# Patient Record
Sex: MALE | Race: WHITE | NOT HISPANIC OR LATINO | Employment: OTHER | URBAN - METROPOLITAN AREA
[De-identification: names, ages, dates, MRNs, and addresses within clinical notes are randomized per-mention and may not be internally consistent; named-entity substitution may affect disease eponyms.]

---

## 2017-07-17 ENCOUNTER — APPOINTMENT (OUTPATIENT)
Dept: GENERAL RADIOLOGY | Facility: HOSPITAL | Age: 77
End: 2017-07-17

## 2017-07-17 ENCOUNTER — HOSPITAL ENCOUNTER (INPATIENT)
Facility: HOSPITAL | Age: 77
LOS: 7 days | Discharge: NURSING, REHAB OR LONG-TERM CARE | End: 2017-07-25
Attending: EMERGENCY MEDICINE | Admitting: INTERNAL MEDICINE

## 2017-07-17 DIAGNOSIS — I95.9 HYPOTENSION, UNSPECIFIED HYPOTENSION TYPE: ICD-10-CM

## 2017-07-17 DIAGNOSIS — R53.1 GENERALIZED WEAKNESS: ICD-10-CM

## 2017-07-17 DIAGNOSIS — J96.01 ACUTE RESPIRATORY FAILURE WITH HYPOXIA (HCC): Primary | ICD-10-CM

## 2017-07-17 DIAGNOSIS — J44.1 COPD EXACERBATION (HCC): ICD-10-CM

## 2017-07-17 LAB
ALBUMIN SERPL-MCNC: 4.1 G/DL (ref 3.5–5.2)
ALBUMIN/GLOB SERPL: 1.1 G/DL
ALP SERPL-CCNC: 108 U/L (ref 39–117)
ALT SERPL W P-5'-P-CCNC: 44 U/L (ref 1–41)
ANION GAP SERPL CALCULATED.3IONS-SCNC: 12.1 MMOL/L
ARTERIAL PATENCY WRIST A: ABNORMAL
AST SERPL-CCNC: 57 U/L (ref 1–40)
ATMOSPHERIC PRESS: 752.2 MMHG
BASE EXCESS BLDA CALC-SCNC: 8.8 MMOL/L (ref 0–2)
BASOPHILS # BLD AUTO: 0.01 10*3/MM3 (ref 0–0.2)
BASOPHILS NFR BLD AUTO: 0.1 % (ref 0–1.5)
BDY SITE: ABNORMAL
BILIRUB SERPL-MCNC: 0.5 MG/DL (ref 0.1–1.2)
BUN BLD-MCNC: 45 MG/DL (ref 8–23)
BUN/CREAT SERPL: 54.9 (ref 7–25)
CALCIUM SPEC-SCNC: 9.9 MG/DL (ref 8.6–10.5)
CHLORIDE SERPL-SCNC: 103 MMOL/L (ref 98–107)
CO2 SERPL-SCNC: 33.9 MMOL/L (ref 22–29)
CREAT BLD-MCNC: 0.82 MG/DL (ref 0.76–1.27)
D-LACTATE SERPL-SCNC: 1.2 MMOL/L (ref 0.5–2)
DEPRECATED RDW RBC AUTO: 51.9 FL (ref 37–54)
EOSINOPHIL # BLD AUTO: 0.01 10*3/MM3 (ref 0–0.7)
EOSINOPHIL NFR BLD AUTO: 0.1 % (ref 0.3–6.2)
ERYTHROCYTE [DISTWIDTH] IN BLOOD BY AUTOMATED COUNT: 13.3 % (ref 11.5–14.5)
GFR SERPL CREATININE-BSD FRML MDRD: 91 ML/MIN/1.73
GLOBULIN UR ELPH-MCNC: 3.9 GM/DL
GLUCOSE BLD-MCNC: 125 MG/DL (ref 65–99)
HCO3 BLDA-SCNC: 36.8 MMOL/L (ref 22–28)
HCT VFR BLD AUTO: 43.1 % (ref 40.4–52.2)
HGB BLD-MCNC: 14 G/DL (ref 13.7–17.6)
IMM GRANULOCYTES # BLD: 0.03 10*3/MM3 (ref 0–0.03)
IMM GRANULOCYTES NFR BLD: 0.2 % (ref 0–0.5)
LYMPHOCYTES # BLD AUTO: 0.65 10*3/MM3 (ref 0.9–4.8)
LYMPHOCYTES NFR BLD AUTO: 4.7 % (ref 19.6–45.3)
MACROCYTES BLD QL SMEAR: NORMAL
MCH RBC QN AUTO: 34.6 PG (ref 27–32.7)
MCHC RBC AUTO-ENTMCNC: 32.5 G/DL (ref 32.6–36.4)
MCV RBC AUTO: 106.4 FL (ref 79.8–96.2)
MODALITY: ABNORMAL
MONOCYTES # BLD AUTO: 0.61 10*3/MM3 (ref 0.2–1.2)
MONOCYTES NFR BLD AUTO: 4.4 % (ref 5–12)
NEUTROPHILS # BLD AUTO: 12.61 10*3/MM3 (ref 1.9–8.1)
NEUTROPHILS NFR BLD AUTO: 90.5 % (ref 42.7–76)
NT-PROBNP SERPL-MCNC: 490.1 PG/ML (ref 0–1800)
PCO2 BLDA: 63.8 MM HG (ref 35–45)
PH BLDA: 7.37 PH UNITS (ref 7.35–7.45)
PLAT MORPH BLD: NORMAL
PLATELET # BLD AUTO: 161 10*3/MM3 (ref 140–500)
PMV BLD AUTO: 10.6 FL (ref 6–12)
PO2 BLDA: 79.4 MM HG (ref 80–100)
POTASSIUM BLD-SCNC: 4.9 MMOL/L (ref 3.5–5.2)
PROT SERPL-MCNC: 8 G/DL (ref 6–8.5)
RBC # BLD AUTO: 4.05 10*6/MM3 (ref 4.6–6)
SAO2 % BLDCOA: 94.7 % (ref 92–99)
SET MECH RESP RATE: 28
SODIUM BLD-SCNC: 149 MMOL/L (ref 136–145)
STOMATOCYTES BLD QL SMEAR: NORMAL
TROPONIN T SERPL-MCNC: 0.01 NG/ML (ref 0–0.03)
WBC MORPH BLD: NORMAL
WBC NRBC COR # BLD: 13.92 10*3/MM3 (ref 4.5–10.7)

## 2017-07-17 PROCEDURE — 36600 WITHDRAWAL OF ARTERIAL BLOOD: CPT

## 2017-07-17 PROCEDURE — 93010 ELECTROCARDIOGRAM REPORT: CPT | Performed by: INTERNAL MEDICINE

## 2017-07-17 PROCEDURE — 84484 ASSAY OF TROPONIN QUANT: CPT | Performed by: EMERGENCY MEDICINE

## 2017-07-17 PROCEDURE — 87040 BLOOD CULTURE FOR BACTERIA: CPT | Performed by: EMERGENCY MEDICINE

## 2017-07-17 PROCEDURE — 99285 EMERGENCY DEPT VISIT HI MDM: CPT

## 2017-07-17 PROCEDURE — 85025 COMPLETE CBC W/AUTO DIFF WBC: CPT | Performed by: EMERGENCY MEDICINE

## 2017-07-17 PROCEDURE — 93005 ELECTROCARDIOGRAM TRACING: CPT

## 2017-07-17 PROCEDURE — 80053 COMPREHEN METABOLIC PANEL: CPT | Performed by: EMERGENCY MEDICINE

## 2017-07-17 PROCEDURE — 71020 HC CHEST PA AND LATERAL: CPT

## 2017-07-17 PROCEDURE — 94640 AIRWAY INHALATION TREATMENT: CPT

## 2017-07-17 PROCEDURE — 83605 ASSAY OF LACTIC ACID: CPT | Performed by: EMERGENCY MEDICINE

## 2017-07-17 PROCEDURE — 85007 BL SMEAR W/DIFF WBC COUNT: CPT | Performed by: EMERGENCY MEDICINE

## 2017-07-17 PROCEDURE — 83880 ASSAY OF NATRIURETIC PEPTIDE: CPT | Performed by: EMERGENCY MEDICINE

## 2017-07-17 PROCEDURE — 82803 BLOOD GASES ANY COMBINATION: CPT

## 2017-07-17 RX ORDER — IPRATROPIUM BROMIDE AND ALBUTEROL SULFATE 2.5; .5 MG/3ML; MG/3ML
3 SOLUTION RESPIRATORY (INHALATION) EVERY 4 HOURS PRN
COMMUNITY

## 2017-07-17 RX ORDER — METHYLPREDNISOLONE SODIUM SUCCINATE 125 MG/2ML
125 INJECTION, POWDER, LYOPHILIZED, FOR SOLUTION INTRAMUSCULAR; INTRAVENOUS ONCE
Status: COMPLETED | OUTPATIENT
Start: 2017-07-17 | End: 2017-07-18

## 2017-07-17 RX ORDER — IPRATROPIUM BROMIDE AND ALBUTEROL SULFATE 2.5; .5 MG/3ML; MG/3ML
3 SOLUTION RESPIRATORY (INHALATION) ONCE
Status: COMPLETED | OUTPATIENT
Start: 2017-07-17 | End: 2017-07-17

## 2017-07-17 RX ORDER — SODIUM CHLORIDE 0.9 % (FLUSH) 0.9 %
10 SYRINGE (ML) INJECTION AS NEEDED
Status: DISCONTINUED | OUTPATIENT
Start: 2017-07-17 | End: 2017-07-25 | Stop reason: HOSPADM

## 2017-07-17 RX ADMIN — SODIUM CHLORIDE 1905 ML: 9 INJECTION, SOLUTION INTRAVENOUS at 23:27

## 2017-07-17 RX ADMIN — IPRATROPIUM BROMIDE AND ALBUTEROL SULFATE 3 ML: .5; 3 SOLUTION RESPIRATORY (INHALATION) at 22:45

## 2017-07-18 LAB
ANION GAP SERPL CALCULATED.3IONS-SCNC: 10.3 MMOL/L
BUN BLD-MCNC: 35 MG/DL (ref 8–23)
BUN/CREAT SERPL: 46.7 (ref 7–25)
CALCIUM SPEC-SCNC: 8.3 MG/DL (ref 8.6–10.5)
CHLORIDE SERPL-SCNC: 104 MMOL/L (ref 98–107)
CO2 SERPL-SCNC: 28.7 MMOL/L (ref 22–29)
CREAT BLD-MCNC: 0.75 MG/DL (ref 0.76–1.27)
DEPRECATED RDW RBC AUTO: 52.5 FL (ref 37–54)
ERYTHROCYTE [DISTWIDTH] IN BLOOD BY AUTOMATED COUNT: 13.4 % (ref 11.5–14.5)
GFR SERPL CREATININE-BSD FRML MDRD: 101 ML/MIN/1.73
GLUCOSE BLD-MCNC: 129 MG/DL (ref 65–99)
GLUCOSE BLDC GLUCOMTR-MCNC: 120 MG/DL (ref 70–130)
GLUCOSE BLDC GLUCOMTR-MCNC: 126 MG/DL (ref 70–130)
GLUCOSE BLDC GLUCOMTR-MCNC: 126 MG/DL (ref 70–130)
GLUCOSE BLDC GLUCOMTR-MCNC: 138 MG/DL (ref 70–130)
HCT VFR BLD AUTO: 35.8 % (ref 40.4–52.2)
HGB BLD-MCNC: 11.4 G/DL (ref 13.7–17.6)
HOLD SPECIMEN: NORMAL
HOLD SPECIMEN: NORMAL
MAGNESIUM SERPL-MCNC: 2.7 MG/DL (ref 1.6–2.4)
MCH RBC QN AUTO: 34.3 PG (ref 27–32.7)
MCHC RBC AUTO-ENTMCNC: 31.8 G/DL (ref 32.6–36.4)
MCV RBC AUTO: 107.8 FL (ref 79.8–96.2)
PHOSPHATE SERPL-MCNC: 3.6 MG/DL (ref 2.5–4.5)
PLATELET # BLD AUTO: 123 10*3/MM3 (ref 140–500)
PMV BLD AUTO: 10.5 FL (ref 6–12)
POTASSIUM BLD-SCNC: 5.1 MMOL/L (ref 3.5–5.2)
RBC # BLD AUTO: 3.32 10*6/MM3 (ref 4.6–6)
SODIUM BLD-SCNC: 143 MMOL/L (ref 136–145)
WBC NRBC COR # BLD: 7.48 10*3/MM3 (ref 4.5–10.7)
WHOLE BLOOD HOLD SPECIMEN: NORMAL
WHOLE BLOOD HOLD SPECIMEN: NORMAL

## 2017-07-18 PROCEDURE — 84100 ASSAY OF PHOSPHORUS: CPT | Performed by: INTERNAL MEDICINE

## 2017-07-18 PROCEDURE — 94799 UNLISTED PULMONARY SVC/PX: CPT

## 2017-07-18 PROCEDURE — 82962 GLUCOSE BLOOD TEST: CPT

## 2017-07-18 PROCEDURE — 80048 BASIC METABOLIC PNL TOTAL CA: CPT | Performed by: INTERNAL MEDICINE

## 2017-07-18 PROCEDURE — 25010000002 METHYLPREDNISOLONE PER 40 MG: Performed by: INTERNAL MEDICINE

## 2017-07-18 PROCEDURE — 25010000002 METHYLPREDNISOLONE PER 125 MG: Performed by: PHYSICIAN ASSISTANT

## 2017-07-18 PROCEDURE — 85027 COMPLETE CBC AUTOMATED: CPT | Performed by: INTERNAL MEDICINE

## 2017-07-18 PROCEDURE — 83735 ASSAY OF MAGNESIUM: CPT | Performed by: INTERNAL MEDICINE

## 2017-07-18 PROCEDURE — 25010000002 PIPERACILLIN SOD-TAZOBACTAM PER 1 G: Performed by: PHYSICIAN ASSISTANT

## 2017-07-18 PROCEDURE — 25010000002 ENOXAPARIN PER 10 MG: Performed by: INTERNAL MEDICINE

## 2017-07-18 RX ORDER — DEXTROSE MONOHYDRATE 25 G/50ML
25 INJECTION, SOLUTION INTRAVENOUS
Status: DISCONTINUED | OUTPATIENT
Start: 2017-07-18 | End: 2017-07-25 | Stop reason: HOSPADM

## 2017-07-18 RX ORDER — BUDESONIDE 0.5 MG/2ML
0.5 INHALANT ORAL
Status: DISCONTINUED | OUTPATIENT
Start: 2017-07-18 | End: 2017-07-18

## 2017-07-18 RX ORDER — AZITHROMYCIN 250 MG/1
500 TABLET, FILM COATED ORAL
Status: COMPLETED | OUTPATIENT
Start: 2017-07-19 | End: 2017-07-21

## 2017-07-18 RX ORDER — SODIUM CHLORIDE 450 MG/100ML
125 INJECTION, SOLUTION INTRAVENOUS CONTINUOUS
Status: DISCONTINUED | OUTPATIENT
Start: 2017-07-18 | End: 2017-07-20

## 2017-07-18 RX ORDER — BUDESONIDE 0.5 MG/2ML
0.5 INHALANT ORAL
Status: DISCONTINUED | OUTPATIENT
Start: 2017-07-18 | End: 2017-07-25 | Stop reason: HOSPADM

## 2017-07-18 RX ORDER — IPRATROPIUM BROMIDE AND ALBUTEROL SULFATE 2.5; .5 MG/3ML; MG/3ML
3 SOLUTION RESPIRATORY (INHALATION)
Status: DISCONTINUED | OUTPATIENT
Start: 2017-07-18 | End: 2017-07-25 | Stop reason: HOSPADM

## 2017-07-18 RX ORDER — METHYLPREDNISOLONE SODIUM SUCCINATE 40 MG/ML
40 INJECTION, POWDER, LYOPHILIZED, FOR SOLUTION INTRAMUSCULAR; INTRAVENOUS EVERY 8 HOURS
Status: DISCONTINUED | OUTPATIENT
Start: 2017-07-18 | End: 2017-07-20

## 2017-07-18 RX ORDER — NICOTINE POLACRILEX 4 MG
15 LOZENGE BUCCAL
Status: DISCONTINUED | OUTPATIENT
Start: 2017-07-18 | End: 2017-07-25 | Stop reason: HOSPADM

## 2017-07-18 RX ADMIN — METHYLPREDNISOLONE SODIUM SUCCINATE 40 MG: 40 INJECTION, POWDER, LYOPHILIZED, FOR SOLUTION INTRAMUSCULAR; INTRAVENOUS at 08:22

## 2017-07-18 RX ADMIN — TAZOBACTAM SODIUM AND PIPERACILLIN SODIUM 4.5 G: 500; 4 INJECTION, SOLUTION INTRAVENOUS at 00:22

## 2017-07-18 RX ADMIN — ENOXAPARIN SODIUM 30 MG: 30 INJECTION SUBCUTANEOUS at 05:22

## 2017-07-18 RX ADMIN — IPRATROPIUM BROMIDE AND ALBUTEROL SULFATE 3 ML: .5; 3 SOLUTION RESPIRATORY (INHALATION) at 15:45

## 2017-07-18 RX ADMIN — BUDESONIDE 0.5 MG: 0.5 INHALANT RESPIRATORY (INHALATION) at 19:38

## 2017-07-18 RX ADMIN — BUDESONIDE 0.5 MG: 0.5 INHALANT RESPIRATORY (INHALATION) at 11:20

## 2017-07-18 RX ADMIN — IPRATROPIUM BROMIDE AND ALBUTEROL SULFATE 3 ML: .5; 3 SOLUTION RESPIRATORY (INHALATION) at 19:38

## 2017-07-18 RX ADMIN — SODIUM CHLORIDE 125 ML/HR: 4.5 INJECTION, SOLUTION INTRAVENOUS at 17:08

## 2017-07-18 RX ADMIN — METHYLPREDNISOLONE SODIUM SUCCINATE 125 MG: 125 INJECTION, POWDER, FOR SOLUTION INTRAMUSCULAR; INTRAVENOUS at 00:22

## 2017-07-18 RX ADMIN — METHYLPREDNISOLONE SODIUM SUCCINATE 40 MG: 40 INJECTION, POWDER, LYOPHILIZED, FOR SOLUTION INTRAMUSCULAR; INTRAVENOUS at 23:57

## 2017-07-18 RX ADMIN — IPRATROPIUM BROMIDE AND ALBUTEROL SULFATE 3 ML: .5; 3 SOLUTION RESPIRATORY (INHALATION) at 04:13

## 2017-07-18 RX ADMIN — IPRATROPIUM BROMIDE AND ALBUTEROL SULFATE 3 ML: .5; 3 SOLUTION RESPIRATORY (INHALATION) at 07:38

## 2017-07-18 RX ADMIN — IPRATROPIUM BROMIDE AND ALBUTEROL SULFATE 3 ML: .5; 3 SOLUTION RESPIRATORY (INHALATION) at 11:21

## 2017-07-18 RX ADMIN — Medication 10 ML: at 00:22

## 2017-07-18 RX ADMIN — METHYLPREDNISOLONE SODIUM SUCCINATE 40 MG: 40 INJECTION, POWDER, LYOPHILIZED, FOR SOLUTION INTRAMUSCULAR; INTRAVENOUS at 17:08

## 2017-07-18 RX ADMIN — SODIUM CHLORIDE 125 ML/HR: 4.5 INJECTION, SOLUTION INTRAVENOUS at 01:20

## 2017-07-18 RX ADMIN — SODIUM CHLORIDE 125 ML/HR: 4.5 INJECTION, SOLUTION INTRAVENOUS at 09:00

## 2017-07-19 LAB
GLUCOSE BLDC GLUCOMTR-MCNC: 113 MG/DL (ref 70–130)
GLUCOSE BLDC GLUCOMTR-MCNC: 114 MG/DL (ref 70–130)
GLUCOSE BLDC GLUCOMTR-MCNC: 141 MG/DL (ref 70–130)
GLUCOSE BLDC GLUCOMTR-MCNC: 151 MG/DL (ref 70–130)

## 2017-07-19 PROCEDURE — 25010000002 ENOXAPARIN PER 10 MG: Performed by: INTERNAL MEDICINE

## 2017-07-19 PROCEDURE — 94799 UNLISTED PULMONARY SVC/PX: CPT

## 2017-07-19 PROCEDURE — 82962 GLUCOSE BLOOD TEST: CPT

## 2017-07-19 PROCEDURE — 25010000002 METHYLPREDNISOLONE PER 40 MG: Performed by: INTERNAL MEDICINE

## 2017-07-19 RX ADMIN — AZITHROMYCIN 500 MG: 250 TABLET, FILM COATED ORAL at 09:02

## 2017-07-19 RX ADMIN — METHYLPREDNISOLONE SODIUM SUCCINATE 40 MG: 40 INJECTION, POWDER, LYOPHILIZED, FOR SOLUTION INTRAMUSCULAR; INTRAVENOUS at 16:00

## 2017-07-19 RX ADMIN — IPRATROPIUM BROMIDE AND ALBUTEROL SULFATE 3 ML: .5; 3 SOLUTION RESPIRATORY (INHALATION) at 20:03

## 2017-07-19 RX ADMIN — IPRATROPIUM BROMIDE AND ALBUTEROL SULFATE 3 ML: .5; 3 SOLUTION RESPIRATORY (INHALATION) at 15:50

## 2017-07-19 RX ADMIN — METHYLPREDNISOLONE SODIUM SUCCINATE 40 MG: 40 INJECTION, POWDER, LYOPHILIZED, FOR SOLUTION INTRAMUSCULAR; INTRAVENOUS at 09:02

## 2017-07-19 RX ADMIN — BUDESONIDE 0.5 MG: 0.5 INHALANT RESPIRATORY (INHALATION) at 08:34

## 2017-07-19 RX ADMIN — ENOXAPARIN SODIUM 40 MG: 40 INJECTION SUBCUTANEOUS at 06:28

## 2017-07-19 RX ADMIN — IPRATROPIUM BROMIDE AND ALBUTEROL SULFATE 3 ML: .5; 3 SOLUTION RESPIRATORY (INHALATION) at 04:09

## 2017-07-19 RX ADMIN — SODIUM CHLORIDE 125 ML/HR: 4.5 INJECTION, SOLUTION INTRAVENOUS at 01:14

## 2017-07-19 RX ADMIN — BUDESONIDE 0.5 MG: 0.5 INHALANT RESPIRATORY (INHALATION) at 20:03

## 2017-07-19 RX ADMIN — IPRATROPIUM BROMIDE AND ALBUTEROL SULFATE 3 ML: .5; 3 SOLUTION RESPIRATORY (INHALATION) at 12:06

## 2017-07-19 RX ADMIN — IPRATROPIUM BROMIDE AND ALBUTEROL SULFATE 3 ML: .5; 3 SOLUTION RESPIRATORY (INHALATION) at 08:35

## 2017-07-19 RX ADMIN — SODIUM CHLORIDE 125 ML/HR: 4.5 INJECTION, SOLUTION INTRAVENOUS at 09:03

## 2017-07-19 RX ADMIN — IPRATROPIUM BROMIDE AND ALBUTEROL SULFATE 3 ML: .5; 3 SOLUTION RESPIRATORY (INHALATION) at 00:41

## 2017-07-20 LAB
ANION GAP SERPL CALCULATED.3IONS-SCNC: 11.3 MMOL/L
BUN BLD-MCNC: 19 MG/DL (ref 8–23)
BUN/CREAT SERPL: 34.5 (ref 7–25)
CALCIUM SPEC-SCNC: 8.7 MG/DL (ref 8.6–10.5)
CHLORIDE SERPL-SCNC: 99 MMOL/L (ref 98–107)
CO2 SERPL-SCNC: 27.7 MMOL/L (ref 22–29)
CREAT BLD-MCNC: 0.55 MG/DL (ref 0.76–1.27)
GFR SERPL CREATININE-BSD FRML MDRD: 144 ML/MIN/1.73
GLUCOSE BLD-MCNC: 118 MG/DL (ref 65–99)
GLUCOSE BLDC GLUCOMTR-MCNC: 118 MG/DL (ref 70–130)
GLUCOSE BLDC GLUCOMTR-MCNC: 120 MG/DL (ref 70–130)
GLUCOSE BLDC GLUCOMTR-MCNC: 127 MG/DL (ref 70–130)
GLUCOSE BLDC GLUCOMTR-MCNC: 148 MG/DL (ref 70–130)
POTASSIUM BLD-SCNC: 4.5 MMOL/L (ref 3.5–5.2)
SODIUM BLD-SCNC: 138 MMOL/L (ref 136–145)

## 2017-07-20 PROCEDURE — 94799 UNLISTED PULMONARY SVC/PX: CPT

## 2017-07-20 PROCEDURE — 80048 BASIC METABOLIC PNL TOTAL CA: CPT | Performed by: INTERNAL MEDICINE

## 2017-07-20 PROCEDURE — 25010000002 METHYLPREDNISOLONE PER 40 MG: Performed by: INTERNAL MEDICINE

## 2017-07-20 PROCEDURE — 0T9B70Z DRAINAGE OF BLADDER WITH DRAINAGE DEVICE, VIA NATURAL OR ARTIFICIAL OPENING: ICD-10-PCS | Performed by: UROLOGY

## 2017-07-20 PROCEDURE — 82962 GLUCOSE BLOOD TEST: CPT

## 2017-07-20 PROCEDURE — 25010000002 ENOXAPARIN PER 10 MG: Performed by: INTERNAL MEDICINE

## 2017-07-20 PROCEDURE — 97162 PT EVAL MOD COMPLEX 30 MIN: CPT

## 2017-07-20 PROCEDURE — 97110 THERAPEUTIC EXERCISES: CPT

## 2017-07-20 RX ORDER — METHYLPREDNISOLONE SODIUM SUCCINATE 40 MG/ML
40 INJECTION, POWDER, LYOPHILIZED, FOR SOLUTION INTRAMUSCULAR; INTRAVENOUS EVERY 12 HOURS
Status: DISCONTINUED | OUTPATIENT
Start: 2017-07-20 | End: 2017-07-24

## 2017-07-20 RX ADMIN — IPRATROPIUM BROMIDE AND ALBUTEROL SULFATE 3 ML: .5; 3 SOLUTION RESPIRATORY (INHALATION) at 03:56

## 2017-07-20 RX ADMIN — IPRATROPIUM BROMIDE AND ALBUTEROL SULFATE 3 ML: .5; 3 SOLUTION RESPIRATORY (INHALATION) at 15:49

## 2017-07-20 RX ADMIN — METHYLPREDNISOLONE SODIUM SUCCINATE 40 MG: 40 INJECTION, POWDER, LYOPHILIZED, FOR SOLUTION INTRAMUSCULAR; INTRAVENOUS at 08:54

## 2017-07-20 RX ADMIN — BUDESONIDE 0.5 MG: 0.5 INHALANT RESPIRATORY (INHALATION) at 07:22

## 2017-07-20 RX ADMIN — SODIUM CHLORIDE 125 ML/HR: 4.5 INJECTION, SOLUTION INTRAVENOUS at 01:46

## 2017-07-20 RX ADMIN — IPRATROPIUM BROMIDE AND ALBUTEROL SULFATE 3 ML: .5; 3 SOLUTION RESPIRATORY (INHALATION) at 11:24

## 2017-07-20 RX ADMIN — ENOXAPARIN SODIUM 40 MG: 40 INJECTION SUBCUTANEOUS at 06:40

## 2017-07-20 RX ADMIN — IPRATROPIUM BROMIDE AND ALBUTEROL SULFATE 3 ML: .5; 3 SOLUTION RESPIRATORY (INHALATION) at 23:47

## 2017-07-20 RX ADMIN — AZITHROMYCIN 500 MG: 250 TABLET, FILM COATED ORAL at 08:54

## 2017-07-20 RX ADMIN — IPRATROPIUM BROMIDE AND ALBUTEROL SULFATE 3 ML: .5; 3 SOLUTION RESPIRATORY (INHALATION) at 07:22

## 2017-07-20 RX ADMIN — METHYLPREDNISOLONE SODIUM SUCCINATE 40 MG: 40 INJECTION, POWDER, FOR SOLUTION INTRAMUSCULAR; INTRAVENOUS at 22:24

## 2017-07-20 RX ADMIN — BUDESONIDE 0.5 MG: 0.5 INHALANT RESPIRATORY (INHALATION) at 20:06

## 2017-07-20 RX ADMIN — IPRATROPIUM BROMIDE AND ALBUTEROL SULFATE 3 ML: .5; 3 SOLUTION RESPIRATORY (INHALATION) at 00:26

## 2017-07-20 RX ADMIN — IPRATROPIUM BROMIDE AND ALBUTEROL SULFATE 3 ML: .5; 3 SOLUTION RESPIRATORY (INHALATION) at 20:06

## 2017-07-20 RX ADMIN — METHYLPREDNISOLONE SODIUM SUCCINATE 40 MG: 40 INJECTION, POWDER, LYOPHILIZED, FOR SOLUTION INTRAMUSCULAR; INTRAVENOUS at 01:45

## 2017-07-21 LAB
GLUCOSE BLDC GLUCOMTR-MCNC: 110 MG/DL (ref 70–130)
GLUCOSE BLDC GLUCOMTR-MCNC: 126 MG/DL (ref 70–130)
GLUCOSE BLDC GLUCOMTR-MCNC: 129 MG/DL (ref 70–130)
GLUCOSE BLDC GLUCOMTR-MCNC: 132 MG/DL (ref 70–130)
GLUCOSE BLDC GLUCOMTR-MCNC: 132 MG/DL (ref 70–130)
GLUCOSE BLDC GLUCOMTR-MCNC: 149 MG/DL (ref 70–130)

## 2017-07-21 PROCEDURE — 97110 THERAPEUTIC EXERCISES: CPT

## 2017-07-21 PROCEDURE — 25010000002 METHYLPREDNISOLONE PER 40 MG: Performed by: INTERNAL MEDICINE

## 2017-07-21 PROCEDURE — 25010000002 ENOXAPARIN PER 10 MG: Performed by: INTERNAL MEDICINE

## 2017-07-21 PROCEDURE — 82962 GLUCOSE BLOOD TEST: CPT

## 2017-07-21 PROCEDURE — 94799 UNLISTED PULMONARY SVC/PX: CPT

## 2017-07-21 RX ADMIN — AZITHROMYCIN 500 MG: 250 TABLET, FILM COATED ORAL at 08:12

## 2017-07-21 RX ADMIN — IPRATROPIUM BROMIDE AND ALBUTEROL SULFATE 3 ML: .5; 3 SOLUTION RESPIRATORY (INHALATION) at 10:58

## 2017-07-21 RX ADMIN — IPRATROPIUM BROMIDE AND ALBUTEROL SULFATE 3 ML: .5; 3 SOLUTION RESPIRATORY (INHALATION) at 07:15

## 2017-07-21 RX ADMIN — METHYLPREDNISOLONE SODIUM SUCCINATE 40 MG: 40 INJECTION, POWDER, FOR SOLUTION INTRAMUSCULAR; INTRAVENOUS at 21:02

## 2017-07-21 RX ADMIN — IPRATROPIUM BROMIDE AND ALBUTEROL SULFATE 3 ML: .5; 3 SOLUTION RESPIRATORY (INHALATION) at 16:11

## 2017-07-21 RX ADMIN — IPRATROPIUM BROMIDE AND ALBUTEROL SULFATE 3 ML: .5; 3 SOLUTION RESPIRATORY (INHALATION) at 19:23

## 2017-07-21 RX ADMIN — BUDESONIDE 0.5 MG: 0.5 INHALANT RESPIRATORY (INHALATION) at 19:22

## 2017-07-21 RX ADMIN — IPRATROPIUM BROMIDE AND ALBUTEROL SULFATE 3 ML: .5; 3 SOLUTION RESPIRATORY (INHALATION) at 03:14

## 2017-07-21 RX ADMIN — BUDESONIDE 0.5 MG: 0.5 INHALANT RESPIRATORY (INHALATION) at 07:15

## 2017-07-21 RX ADMIN — METHYLPREDNISOLONE SODIUM SUCCINATE 40 MG: 40 INJECTION, POWDER, FOR SOLUTION INTRAMUSCULAR; INTRAVENOUS at 08:12

## 2017-07-21 RX ADMIN — ENOXAPARIN SODIUM 40 MG: 40 INJECTION SUBCUTANEOUS at 05:37

## 2017-07-22 LAB
BACTERIA SPEC AEROBE CULT: NORMAL
GLUCOSE BLDC GLUCOMTR-MCNC: 100 MG/DL (ref 70–130)
GLUCOSE BLDC GLUCOMTR-MCNC: 121 MG/DL (ref 70–130)
GLUCOSE BLDC GLUCOMTR-MCNC: 129 MG/DL (ref 70–130)
GLUCOSE BLDC GLUCOMTR-MCNC: 134 MG/DL (ref 70–130)

## 2017-07-22 PROCEDURE — 82962 GLUCOSE BLOOD TEST: CPT

## 2017-07-22 PROCEDURE — 97110 THERAPEUTIC EXERCISES: CPT

## 2017-07-22 PROCEDURE — 25010000002 METHYLPREDNISOLONE PER 40 MG: Performed by: INTERNAL MEDICINE

## 2017-07-22 PROCEDURE — 94799 UNLISTED PULMONARY SVC/PX: CPT

## 2017-07-22 PROCEDURE — 25010000002 ENOXAPARIN PER 10 MG: Performed by: INTERNAL MEDICINE

## 2017-07-22 RX ORDER — DOCUSATE SODIUM 50 MG/5 ML
150 LIQUID (ML) ORAL 2 TIMES DAILY
Status: DISCONTINUED | OUTPATIENT
Start: 2017-07-22 | End: 2017-07-25 | Stop reason: HOSPADM

## 2017-07-22 RX ADMIN — METHYLPREDNISOLONE SODIUM SUCCINATE 40 MG: 40 INJECTION, POWDER, FOR SOLUTION INTRAMUSCULAR; INTRAVENOUS at 09:36

## 2017-07-22 RX ADMIN — DOCUSATE SODIUM 150 MG: 50 LIQUID ORAL at 17:39

## 2017-07-22 RX ADMIN — IPRATROPIUM BROMIDE AND ALBUTEROL SULFATE 3 ML: .5; 3 SOLUTION RESPIRATORY (INHALATION) at 11:28

## 2017-07-22 RX ADMIN — ENOXAPARIN SODIUM 40 MG: 40 INJECTION SUBCUTANEOUS at 05:07

## 2017-07-22 RX ADMIN — IPRATROPIUM BROMIDE AND ALBUTEROL SULFATE 3 ML: .5; 3 SOLUTION RESPIRATORY (INHALATION) at 20:17

## 2017-07-22 RX ADMIN — METHYLPREDNISOLONE SODIUM SUCCINATE 40 MG: 40 INJECTION, POWDER, FOR SOLUTION INTRAMUSCULAR; INTRAVENOUS at 21:54

## 2017-07-22 RX ADMIN — IPRATROPIUM BROMIDE AND ALBUTEROL SULFATE 3 ML: .5; 3 SOLUTION RESPIRATORY (INHALATION) at 07:07

## 2017-07-22 RX ADMIN — BUDESONIDE 0.5 MG: 0.5 INHALANT RESPIRATORY (INHALATION) at 07:07

## 2017-07-22 RX ADMIN — BUDESONIDE 0.5 MG: 0.5 INHALANT RESPIRATORY (INHALATION) at 20:17

## 2017-07-22 RX ADMIN — IPRATROPIUM BROMIDE AND ALBUTEROL SULFATE 3 ML: .5; 3 SOLUTION RESPIRATORY (INHALATION) at 15:09

## 2017-07-23 LAB
GLUCOSE BLDC GLUCOMTR-MCNC: 116 MG/DL (ref 70–130)
GLUCOSE BLDC GLUCOMTR-MCNC: 119 MG/DL (ref 70–130)
GLUCOSE BLDC GLUCOMTR-MCNC: 121 MG/DL (ref 70–130)
GLUCOSE BLDC GLUCOMTR-MCNC: 143 MG/DL (ref 70–130)
PLATELET # BLD AUTO: 136 10*3/MM3 (ref 140–500)

## 2017-07-23 PROCEDURE — 97110 THERAPEUTIC EXERCISES: CPT

## 2017-07-23 PROCEDURE — 25010000002 METHYLPREDNISOLONE PER 40 MG: Performed by: INTERNAL MEDICINE

## 2017-07-23 PROCEDURE — 94799 UNLISTED PULMONARY SVC/PX: CPT

## 2017-07-23 PROCEDURE — 85049 AUTOMATED PLATELET COUNT: CPT | Performed by: INTERNAL MEDICINE

## 2017-07-23 PROCEDURE — 82962 GLUCOSE BLOOD TEST: CPT

## 2017-07-23 PROCEDURE — 25010000002 ENOXAPARIN PER 10 MG: Performed by: INTERNAL MEDICINE

## 2017-07-23 RX ORDER — TERAZOSIN 1 MG/1
1 CAPSULE ORAL EVERY 12 HOURS SCHEDULED
Status: DISCONTINUED | OUTPATIENT
Start: 2017-07-23 | End: 2017-07-25 | Stop reason: HOSPADM

## 2017-07-23 RX ADMIN — ENOXAPARIN SODIUM 40 MG: 40 INJECTION SUBCUTANEOUS at 05:50

## 2017-07-23 RX ADMIN — BUDESONIDE 0.5 MG: 0.5 INHALANT RESPIRATORY (INHALATION) at 19:55

## 2017-07-23 RX ADMIN — DOCUSATE SODIUM 150 MG: 50 LIQUID ORAL at 09:09

## 2017-07-23 RX ADMIN — TERAZOSIN HYDROCHLORIDE ANHYDROUS 1 MG: 1 CAPSULE ORAL at 20:48

## 2017-07-23 RX ADMIN — DOCUSATE SODIUM 150 MG: 50 LIQUID ORAL at 17:15

## 2017-07-23 RX ADMIN — IPRATROPIUM BROMIDE AND ALBUTEROL SULFATE 3 ML: .5; 3 SOLUTION RESPIRATORY (INHALATION) at 23:59

## 2017-07-23 RX ADMIN — BUDESONIDE 0.5 MG: 0.5 INHALANT RESPIRATORY (INHALATION) at 07:08

## 2017-07-23 RX ADMIN — IPRATROPIUM BROMIDE AND ALBUTEROL SULFATE 3 ML: .5; 3 SOLUTION RESPIRATORY (INHALATION) at 07:08

## 2017-07-23 RX ADMIN — METHYLPREDNISOLONE SODIUM SUCCINATE 40 MG: 40 INJECTION, POWDER, FOR SOLUTION INTRAMUSCULAR; INTRAVENOUS at 08:49

## 2017-07-23 RX ADMIN — METHYLPREDNISOLONE SODIUM SUCCINATE 40 MG: 40 INJECTION, POWDER, FOR SOLUTION INTRAMUSCULAR; INTRAVENOUS at 20:50

## 2017-07-23 RX ADMIN — TERAZOSIN HYDROCHLORIDE ANHYDROUS 1 MG: 1 CAPSULE ORAL at 14:31

## 2017-07-23 RX ADMIN — IPRATROPIUM BROMIDE AND ALBUTEROL SULFATE 3 ML: .5; 3 SOLUTION RESPIRATORY (INHALATION) at 00:12

## 2017-07-23 RX ADMIN — IPRATROPIUM BROMIDE AND ALBUTEROL SULFATE 3 ML: .5; 3 SOLUTION RESPIRATORY (INHALATION) at 10:49

## 2017-07-23 RX ADMIN — IPRATROPIUM BROMIDE AND ALBUTEROL SULFATE 3 ML: .5; 3 SOLUTION RESPIRATORY (INHALATION) at 03:45

## 2017-07-23 RX ADMIN — IPRATROPIUM BROMIDE AND ALBUTEROL SULFATE 3 ML: .5; 3 SOLUTION RESPIRATORY (INHALATION) at 19:55

## 2017-07-23 RX ADMIN — IPRATROPIUM BROMIDE AND ALBUTEROL SULFATE 3 ML: .5; 3 SOLUTION RESPIRATORY (INHALATION) at 15:06

## 2017-07-24 LAB
GLUCOSE BLDC GLUCOMTR-MCNC: 107 MG/DL (ref 70–130)
GLUCOSE BLDC GLUCOMTR-MCNC: 116 MG/DL (ref 70–130)
GLUCOSE BLDC GLUCOMTR-MCNC: 121 MG/DL (ref 70–130)
GLUCOSE BLDC GLUCOMTR-MCNC: 146 MG/DL (ref 70–130)

## 2017-07-24 PROCEDURE — 25010000002 METHYLPREDNISOLONE PER 40 MG: Performed by: INTERNAL MEDICINE

## 2017-07-24 PROCEDURE — 63710000001 PREDNISONE PER 1 MG: Performed by: INTERNAL MEDICINE

## 2017-07-24 PROCEDURE — 82962 GLUCOSE BLOOD TEST: CPT

## 2017-07-24 PROCEDURE — 97110 THERAPEUTIC EXERCISES: CPT

## 2017-07-24 PROCEDURE — 25010000002 ENOXAPARIN PER 10 MG: Performed by: INTERNAL MEDICINE

## 2017-07-24 PROCEDURE — 94799 UNLISTED PULMONARY SVC/PX: CPT

## 2017-07-24 RX ORDER — PREDNISONE 20 MG/1
20 TABLET ORAL 2 TIMES DAILY WITH MEALS
Status: DISCONTINUED | OUTPATIENT
Start: 2017-07-24 | End: 2017-07-25

## 2017-07-24 RX ADMIN — METHYLPREDNISOLONE SODIUM SUCCINATE 40 MG: 40 INJECTION, POWDER, FOR SOLUTION INTRAMUSCULAR; INTRAVENOUS at 09:22

## 2017-07-24 RX ADMIN — DOCUSATE SODIUM 150 MG: 50 LIQUID ORAL at 09:22

## 2017-07-24 RX ADMIN — IPRATROPIUM BROMIDE AND ALBUTEROL SULFATE 3 ML: .5; 3 SOLUTION RESPIRATORY (INHALATION) at 15:08

## 2017-07-24 RX ADMIN — IPRATROPIUM BROMIDE AND ALBUTEROL SULFATE 3 ML: .5; 3 SOLUTION RESPIRATORY (INHALATION) at 23:43

## 2017-07-24 RX ADMIN — IPRATROPIUM BROMIDE AND ALBUTEROL SULFATE 3 ML: .5; 3 SOLUTION RESPIRATORY (INHALATION) at 11:15

## 2017-07-24 RX ADMIN — BUDESONIDE 0.5 MG: 0.5 INHALANT RESPIRATORY (INHALATION) at 08:18

## 2017-07-24 RX ADMIN — IPRATROPIUM BROMIDE AND ALBUTEROL SULFATE 3 ML: .5; 3 SOLUTION RESPIRATORY (INHALATION) at 19:25

## 2017-07-24 RX ADMIN — BUDESONIDE 0.5 MG: 0.5 INHALANT RESPIRATORY (INHALATION) at 19:26

## 2017-07-24 RX ADMIN — DOCUSATE SODIUM 150 MG: 50 LIQUID ORAL at 17:14

## 2017-07-24 RX ADMIN — IPRATROPIUM BROMIDE AND ALBUTEROL SULFATE 3 ML: .5; 3 SOLUTION RESPIRATORY (INHALATION) at 03:38

## 2017-07-24 RX ADMIN — ENOXAPARIN SODIUM 40 MG: 40 INJECTION SUBCUTANEOUS at 06:42

## 2017-07-24 RX ADMIN — PREDNISONE 20 MG: 20 TABLET ORAL at 17:14

## 2017-07-24 RX ADMIN — TERAZOSIN HYDROCHLORIDE ANHYDROUS 1 MG: 1 CAPSULE ORAL at 09:22

## 2017-07-24 RX ADMIN — IPRATROPIUM BROMIDE AND ALBUTEROL SULFATE 3 ML: .5; 3 SOLUTION RESPIRATORY (INHALATION) at 08:18

## 2017-07-25 VITALS
OXYGEN SATURATION: 96 % | DIASTOLIC BLOOD PRESSURE: 63 MMHG | RESPIRATION RATE: 18 BRPM | TEMPERATURE: 97.7 F | WEIGHT: 140 LBS | BODY MASS INDEX: 23.32 KG/M2 | HEART RATE: 97 BPM | HEIGHT: 65 IN | SYSTOLIC BLOOD PRESSURE: 97 MMHG

## 2017-07-25 LAB
ANION GAP SERPL CALCULATED.3IONS-SCNC: 7 MMOL/L
BUN BLD-MCNC: 25 MG/DL (ref 8–23)
BUN/CREAT SERPL: 44.6 (ref 7–25)
CALCIUM SPEC-SCNC: 9 MG/DL (ref 8.6–10.5)
CHLORIDE SERPL-SCNC: 96 MMOL/L (ref 98–107)
CO2 SERPL-SCNC: 34 MMOL/L (ref 22–29)
CREAT BLD-MCNC: 0.56 MG/DL (ref 0.76–1.27)
GFR SERPL CREATININE-BSD FRML MDRD: 141 ML/MIN/1.73
GLUCOSE BLD-MCNC: 127 MG/DL (ref 65–99)
GLUCOSE BLDC GLUCOMTR-MCNC: 112 MG/DL (ref 70–130)
GLUCOSE BLDC GLUCOMTR-MCNC: 122 MG/DL (ref 70–130)
GLUCOSE BLDC GLUCOMTR-MCNC: 128 MG/DL (ref 70–130)
POTASSIUM BLD-SCNC: 4.9 MMOL/L (ref 3.5–5.2)
SODIUM BLD-SCNC: 137 MMOL/L (ref 136–145)

## 2017-07-25 PROCEDURE — 25010000002 ENOXAPARIN PER 10 MG: Performed by: INTERNAL MEDICINE

## 2017-07-25 PROCEDURE — 82962 GLUCOSE BLOOD TEST: CPT

## 2017-07-25 PROCEDURE — 63710000001 PREDNISONE PER 5 MG: Performed by: INTERNAL MEDICINE

## 2017-07-25 PROCEDURE — 94799 UNLISTED PULMONARY SVC/PX: CPT

## 2017-07-25 PROCEDURE — 63710000001 PREDNISONE PER 1 MG: Performed by: INTERNAL MEDICINE

## 2017-07-25 PROCEDURE — 80048 BASIC METABOLIC PNL TOTAL CA: CPT | Performed by: INTERNAL MEDICINE

## 2017-07-25 PROCEDURE — 97110 THERAPEUTIC EXERCISES: CPT

## 2017-07-25 RX ORDER — TERAZOSIN 1 MG/1
1 CAPSULE ORAL EVERY 12 HOURS SCHEDULED
Qty: 60 CAPSULE | Refills: 1 | Status: SHIPPED | OUTPATIENT
Start: 2017-07-25

## 2017-07-25 RX ORDER — DOCUSATE SODIUM 50 MG/5 ML
150 LIQUID (ML) ORAL DAILY
Qty: 450 ML | Refills: 2 | Status: ON HOLD | OUTPATIENT
Start: 2017-07-25 | End: 2017-08-17

## 2017-07-25 RX ORDER — PREDNISONE 10 MG/1
10 TABLET ORAL 2 TIMES DAILY WITH MEALS
Status: DISCONTINUED | OUTPATIENT
Start: 2017-07-25 | End: 2017-07-25 | Stop reason: HOSPADM

## 2017-07-25 RX ADMIN — IPRATROPIUM BROMIDE AND ALBUTEROL SULFATE 3 ML: .5; 3 SOLUTION RESPIRATORY (INHALATION) at 15:47

## 2017-07-25 RX ADMIN — IPRATROPIUM BROMIDE AND ALBUTEROL SULFATE 3 ML: .5; 3 SOLUTION RESPIRATORY (INHALATION) at 07:24

## 2017-07-25 RX ADMIN — ENOXAPARIN SODIUM 40 MG: 40 INJECTION SUBCUTANEOUS at 06:51

## 2017-07-25 RX ADMIN — IPRATROPIUM BROMIDE AND ALBUTEROL SULFATE 3 ML: .5; 3 SOLUTION RESPIRATORY (INHALATION) at 03:39

## 2017-07-25 RX ADMIN — PREDNISONE 20 MG: 20 TABLET ORAL at 08:01

## 2017-07-25 RX ADMIN — BUDESONIDE 0.5 MG: 0.5 INHALANT RESPIRATORY (INHALATION) at 07:24

## 2017-07-25 RX ADMIN — IPRATROPIUM BROMIDE AND ALBUTEROL SULFATE 3 ML: .5; 3 SOLUTION RESPIRATORY (INHALATION) at 12:08

## 2017-07-25 RX ADMIN — PREDNISONE 10 MG: 10 TABLET ORAL at 18:01

## 2017-07-25 RX ADMIN — DOCUSATE SODIUM 150 MG: 50 LIQUID ORAL at 08:02

## 2017-07-25 RX ADMIN — DOCUSATE SODIUM 150 MG: 50 LIQUID ORAL at 18:00

## 2017-07-25 RX ADMIN — TERAZOSIN HYDROCHLORIDE ANHYDROUS 1 MG: 1 CAPSULE ORAL at 08:01

## 2017-08-17 ENCOUNTER — HOSPITAL ENCOUNTER (OUTPATIENT)
Facility: HOSPITAL | Age: 77
Setting detail: OBSERVATION
Discharge: NURSING, REHAB OR LONG-TERM CARE | End: 2017-08-22
Attending: INTERNAL MEDICINE | Admitting: INTERNAL MEDICINE

## 2017-08-17 DIAGNOSIS — R26.2 DIFFICULTY WALKING: ICD-10-CM

## 2017-08-17 DIAGNOSIS — K92.2 LOWER GI BLEED: Primary | ICD-10-CM

## 2017-08-17 PROBLEM — J96.12 CHRONIC RESPIRATORY FAILURE WITH HYPOXIA AND HYPERCAPNIA (HCC): Status: ACTIVE | Noted: 2017-08-17

## 2017-08-17 PROBLEM — H91.93 BILATERAL DEAFNESS: Status: ACTIVE | Noted: 2017-08-17

## 2017-08-17 PROBLEM — J44.9 COPD (CHRONIC OBSTRUCTIVE PULMONARY DISEASE) (HCC): Status: ACTIVE | Noted: 2017-08-17

## 2017-08-17 PROBLEM — J96.11 CHRONIC RESPIRATORY FAILURE WITH HYPOXIA AND HYPERCAPNIA (HCC): Status: ACTIVE | Noted: 2017-08-17

## 2017-08-17 PROBLEM — Z93.1 S/P PERCUTANEOUS ENDOSCOPIC GASTROSTOMY (PEG) TUBE PLACEMENT (HCC): Status: ACTIVE | Noted: 2017-08-17

## 2017-08-17 PROBLEM — R13.10 DYSPHAGIA: Status: ACTIVE | Noted: 2017-08-17

## 2017-08-17 LAB
ALBUMIN SERPL-MCNC: 3.2 G/DL (ref 3.5–5.2)
ALBUMIN SERPL-MCNC: 3.2 G/DL (ref 3.5–5.2)
ALBUMIN/GLOB SERPL: 1 G/DL
ALBUMIN/GLOB SERPL: 1.1 G/DL
ALP SERPL-CCNC: 141 U/L (ref 39–117)
ALP SERPL-CCNC: 143 U/L (ref 39–117)
ALT SERPL W P-5'-P-CCNC: 110 U/L (ref 1–41)
ALT SERPL W P-5'-P-CCNC: 116 U/L (ref 1–41)
ANION GAP SERPL CALCULATED.3IONS-SCNC: 10.4 MMOL/L
ANION GAP SERPL CALCULATED.3IONS-SCNC: 10.6 MMOL/L
ANION GAP SERPL CALCULATED.3IONS-SCNC: 9.2 MMOL/L
AST SERPL-CCNC: 77 U/L (ref 1–40)
AST SERPL-CCNC: 78 U/L (ref 1–40)
BASOPHILS # BLD AUTO: 0.01 10*3/MM3 (ref 0–0.2)
BASOPHILS NFR BLD AUTO: 0.2 % (ref 0–1.5)
BILIRUB SERPL-MCNC: 0.7 MG/DL (ref 0.1–1.2)
BILIRUB SERPL-MCNC: 0.7 MG/DL (ref 0.1–1.2)
BUN BLD-MCNC: 22 MG/DL (ref 8–23)
BUN BLD-MCNC: 23 MG/DL (ref 8–23)
BUN BLD-MCNC: 28 MG/DL (ref 8–23)
BUN/CREAT SERPL: 40.7 (ref 7–25)
BUN/CREAT SERPL: 42.6 (ref 7–25)
BUN/CREAT SERPL: 51.9 (ref 7–25)
CALCIUM SPEC-SCNC: 8.6 MG/DL (ref 8.6–10.5)
CALCIUM SPEC-SCNC: 8.7 MG/DL (ref 8.6–10.5)
CALCIUM SPEC-SCNC: 8.7 MG/DL (ref 8.6–10.5)
CHLORIDE SERPL-SCNC: 101 MMOL/L (ref 98–107)
CHLORIDE SERPL-SCNC: 102 MMOL/L (ref 98–107)
CHLORIDE SERPL-SCNC: 104 MMOL/L (ref 98–107)
CO2 SERPL-SCNC: 28.4 MMOL/L (ref 22–29)
CO2 SERPL-SCNC: 28.6 MMOL/L (ref 22–29)
CO2 SERPL-SCNC: 29.8 MMOL/L (ref 22–29)
CREAT BLD-MCNC: 0.54 MG/DL (ref 0.76–1.27)
DEPRECATED RDW RBC AUTO: 50.8 FL (ref 37–54)
DEPRECATED RDW RBC AUTO: 52 FL (ref 37–54)
EOSINOPHIL # BLD AUTO: 0.1 10*3/MM3 (ref 0–0.7)
EOSINOPHIL NFR BLD AUTO: 2.1 % (ref 0.3–6.2)
ERYTHROCYTE [DISTWIDTH] IN BLOOD BY AUTOMATED COUNT: 13 % (ref 11.5–14.5)
ERYTHROCYTE [DISTWIDTH] IN BLOOD BY AUTOMATED COUNT: 13.2 % (ref 11.5–14.5)
GFR SERPL CREATININE-BSD FRML MDRD: 147 ML/MIN/1.73
GLOBULIN UR ELPH-MCNC: 2.8 GM/DL
GLOBULIN UR ELPH-MCNC: 3.2 GM/DL
GLUCOSE BLD-MCNC: 73 MG/DL (ref 65–99)
GLUCOSE BLD-MCNC: 98 MG/DL (ref 65–99)
GLUCOSE BLD-MCNC: 98 MG/DL (ref 65–99)
HCT VFR BLD AUTO: 35.4 % (ref 40.4–52.2)
HCT VFR BLD AUTO: 36.5 % (ref 40.4–52.2)
HCT VFR BLD AUTO: 36.6 % (ref 40.4–52.2)
HCT VFR BLD AUTO: 38.1 % (ref 40.4–52.2)
HGB BLD-MCNC: 11.2 G/DL (ref 13.7–17.6)
HGB BLD-MCNC: 11.2 G/DL (ref 13.7–17.6)
HGB BLD-MCNC: 11.4 G/DL (ref 13.7–17.6)
HGB BLD-MCNC: 12.1 G/DL (ref 13.7–17.6)
IMM GRANULOCYTES # BLD: 0.03 10*3/MM3 (ref 0–0.03)
IMM GRANULOCYTES NFR BLD: 0.6 % (ref 0–0.5)
INR PPP: 1.1 (ref 0.9–1.1)
INR PPP: 1.18 (ref 0.9–1.1)
LYMPHOCYTES # BLD AUTO: 0.62 10*3/MM3 (ref 0.9–4.8)
LYMPHOCYTES NFR BLD AUTO: 13.3 % (ref 19.6–45.3)
MACROCYTES BLD QL SMEAR: NORMAL
MCH RBC QN AUTO: 33.9 PG (ref 27–32.7)
MCH RBC QN AUTO: 34.6 PG (ref 27–32.7)
MCHC RBC AUTO-ENTMCNC: 31.1 G/DL (ref 32.6–36.4)
MCHC RBC AUTO-ENTMCNC: 31.8 G/DL (ref 32.6–36.4)
MCV RBC AUTO: 108.9 FL (ref 79.8–96.2)
MCV RBC AUTO: 108.9 FL (ref 79.8–96.2)
MONOCYTES # BLD AUTO: 0.49 10*3/MM3 (ref 0.2–1.2)
MONOCYTES NFR BLD AUTO: 10.5 % (ref 5–12)
NEUTROPHILS # BLD AUTO: 3.41 10*3/MM3 (ref 1.9–8.1)
NEUTROPHILS NFR BLD AUTO: 73.3 % (ref 42.7–76)
PLAT MORPH BLD: NORMAL
PLATELET # BLD AUTO: 149 10*3/MM3 (ref 140–500)
PLATELET # BLD AUTO: 157 10*3/MM3 (ref 140–500)
PMV BLD AUTO: 9.6 FL (ref 6–12)
PMV BLD AUTO: 9.8 FL (ref 6–12)
POTASSIUM BLD-SCNC: 4.4 MMOL/L (ref 3.5–5.2)
POTASSIUM BLD-SCNC: 4.5 MMOL/L (ref 3.5–5.2)
POTASSIUM BLD-SCNC: 4.6 MMOL/L (ref 3.5–5.2)
PROT SERPL-MCNC: 6 G/DL (ref 6–8.5)
PROT SERPL-MCNC: 6.4 G/DL (ref 6–8.5)
PROTHROMBIN TIME: 13.8 SECONDS (ref 11.7–14.2)
PROTHROMBIN TIME: 14.6 SECONDS (ref 11.7–14.2)
RBC # BLD AUTO: 3.36 10*6/MM3 (ref 4.6–6)
RBC # BLD AUTO: 3.5 10*6/MM3 (ref 4.6–6)
SODIUM BLD-SCNC: 140 MMOL/L (ref 136–145)
SODIUM BLD-SCNC: 141 MMOL/L (ref 136–145)
SODIUM BLD-SCNC: 143 MMOL/L (ref 136–145)
STOMATOCYTES BLD QL SMEAR: NORMAL
WBC MORPH BLD: NORMAL
WBC NRBC COR # BLD: 4.04 10*3/MM3 (ref 4.5–10.7)
WBC NRBC COR # BLD: 4.66 10*3/MM3 (ref 4.5–10.7)

## 2017-08-17 PROCEDURE — 80053 COMPREHEN METABOLIC PANEL: CPT | Performed by: INTERNAL MEDICINE

## 2017-08-17 PROCEDURE — 85610 PROTHROMBIN TIME: CPT | Performed by: INTERNAL MEDICINE

## 2017-08-17 PROCEDURE — 80048 BASIC METABOLIC PNL TOTAL CA: CPT | Performed by: INTERNAL MEDICINE

## 2017-08-17 PROCEDURE — 85027 COMPLETE CBC AUTOMATED: CPT | Performed by: INTERNAL MEDICINE

## 2017-08-17 PROCEDURE — G0379 DIRECT REFER HOSPITAL OBSERV: HCPCS

## 2017-08-17 PROCEDURE — 85018 HEMOGLOBIN: CPT | Performed by: INTERNAL MEDICINE

## 2017-08-17 PROCEDURE — 85007 BL SMEAR W/DIFF WBC COUNT: CPT | Performed by: INTERNAL MEDICINE

## 2017-08-17 PROCEDURE — 85014 HEMATOCRIT: CPT | Performed by: INTERNAL MEDICINE

## 2017-08-17 PROCEDURE — G0378 HOSPITAL OBSERVATION PER HR: HCPCS

## 2017-08-17 PROCEDURE — 93010 ELECTROCARDIOGRAM REPORT: CPT | Performed by: INTERNAL MEDICINE

## 2017-08-17 PROCEDURE — 99221 1ST HOSP IP/OBS SF/LOW 40: CPT | Performed by: INTERNAL MEDICINE

## 2017-08-17 PROCEDURE — 93005 ELECTROCARDIOGRAM TRACING: CPT | Performed by: INTERNAL MEDICINE

## 2017-08-17 PROCEDURE — 85025 COMPLETE CBC W/AUTO DIFF WBC: CPT | Performed by: INTERNAL MEDICINE

## 2017-08-17 RX ORDER — IPRATROPIUM BROMIDE AND ALBUTEROL SULFATE 2.5; .5 MG/3ML; MG/3ML
3 SOLUTION RESPIRATORY (INHALATION) EVERY 6 HOURS
Status: DISCONTINUED | OUTPATIENT
Start: 2017-08-18 | End: 2017-08-22 | Stop reason: HOSPADM

## 2017-08-17 RX ORDER — SODIUM CHLORIDE 0.9 % (FLUSH) 0.9 %
1-10 SYRINGE (ML) INJECTION AS NEEDED
Status: DISCONTINUED | OUTPATIENT
Start: 2017-08-17 | End: 2017-08-22 | Stop reason: HOSPADM

## 2017-08-17 RX ORDER — SODIUM CHLORIDE 9 MG/ML
75 INJECTION, SOLUTION INTRAVENOUS CONTINUOUS
Status: DISCONTINUED | OUTPATIENT
Start: 2017-08-17 | End: 2017-08-20

## 2017-08-17 RX ORDER — CALCIUM ACETATE MONOHYDRATE AND ALUMINUM SULFATE TETRADECAHYDRATE 952; 1347 MG/2299MG; MG/2299MG
1 POWDER, FOR SOLUTION TOPICAL EVERY 8 HOURS SCHEDULED
Status: DISCONTINUED | OUTPATIENT
Start: 2017-08-17 | End: 2017-08-22 | Stop reason: HOSPADM

## 2017-08-17 RX ORDER — IPRATROPIUM BROMIDE AND ALBUTEROL SULFATE 2.5; .5 MG/3ML; MG/3ML
3 SOLUTION RESPIRATORY (INHALATION)
COMMUNITY

## 2017-08-17 RX ORDER — SODIUM CHLORIDE FOR INHALATION 3 %
4 VIAL, NEBULIZER (ML) INHALATION
Status: DISCONTINUED | OUTPATIENT
Start: 2017-08-18 | End: 2017-08-17

## 2017-08-17 RX ORDER — TERAZOSIN 1 MG/1
1 CAPSULE ORAL EVERY 12 HOURS SCHEDULED
Status: DISCONTINUED | OUTPATIENT
Start: 2017-08-17 | End: 2017-08-19

## 2017-08-17 RX ORDER — ONDANSETRON 2 MG/ML
4 INJECTION INTRAMUSCULAR; INTRAVENOUS EVERY 6 HOURS PRN
Status: DISCONTINUED | OUTPATIENT
Start: 2017-08-17 | End: 2017-08-22 | Stop reason: HOSPADM

## 2017-08-17 RX ORDER — ESCITALOPRAM OXALATE 10 MG/1
10 TABLET ORAL DAILY
COMMUNITY

## 2017-08-17 RX ORDER — HYDROCORTISONE ACETATE 25 MG/1
25 SUPPOSITORY RECTAL 2 TIMES DAILY
Status: DISCONTINUED | OUTPATIENT
Start: 2017-08-17 | End: 2017-08-22 | Stop reason: HOSPADM

## 2017-08-17 RX ORDER — SODIUM CHLORIDE FOR INHALATION 3 %
4 VIAL, NEBULIZER (ML) INHALATION ONCE
Status: COMPLETED | OUTPATIENT
Start: 2017-08-18 | End: 2017-08-18

## 2017-08-17 RX ORDER — ESCITALOPRAM OXALATE 10 MG/1
10 TABLET ORAL DAILY
Status: DISCONTINUED | OUTPATIENT
Start: 2017-08-17 | End: 2017-08-22 | Stop reason: HOSPADM

## 2017-08-17 RX ORDER — IPRATROPIUM BROMIDE AND ALBUTEROL SULFATE 2.5; .5 MG/3ML; MG/3ML
3 SOLUTION RESPIRATORY (INHALATION) EVERY 4 HOURS PRN
Status: DISCONTINUED | OUTPATIENT
Start: 2017-08-17 | End: 2017-08-22 | Stop reason: HOSPADM

## 2017-08-17 RX ORDER — POLYETHYLENE GLYCOL 3350, SODIUM CHLORIDE, POTASSIUM CHLORIDE, SODIUM BICARBONATE, AND SODIUM SULFATE 240; 5.84; 2.98; 6.72; 22.72 G/4L; G/4L; G/4L; G/4L; G/4L
2000 POWDER, FOR SOLUTION ORAL DAILY
Status: COMPLETED | OUTPATIENT
Start: 2017-08-17 | End: 2017-08-18

## 2017-08-17 RX ADMIN — HYDROCORTISONE ACETATE 25 MG: 25 SUPPOSITORY RECTAL at 10:08

## 2017-08-17 RX ADMIN — CALCIUM ACETATE MONOHYDRATE AND ALUMINUM SULFATE TETRADECAHYDRATE 1 PACKET: 952; 1347 POWDER, FOR SOLUTION TOPICAL at 15:00

## 2017-08-17 RX ADMIN — CALCIUM ACETATE MONOHYDRATE AND ALUMINUM SULFATE TETRADECAHYDRATE 1 PACKET: 952; 1347 POWDER, FOR SOLUTION TOPICAL at 22:12

## 2017-08-17 RX ADMIN — SODIUM CHLORIDE 75 ML/HR: 9 INJECTION, SOLUTION INTRAVENOUS at 04:24

## 2017-08-17 RX ADMIN — TERAZOSIN HYDROCHLORIDE 1 MG: 1 CAPSULE ORAL at 10:08

## 2017-08-17 RX ADMIN — TERAZOSIN HYDROCHLORIDE 1 MG: 1 CAPSULE ORAL at 21:03

## 2017-08-17 RX ADMIN — HYDROCORTISONE: 25 CREAM TOPICAL at 21:03

## 2017-08-17 RX ADMIN — POLYETHYLENE GLYCOL 3350, SODIUM CHLORIDE, POTASSIUM CHLORIDE, SODIUM BICARBONATE, AND SODIUM SULFATE 2000 ML: 240; 5.84; 2.98; 6.72; 22.72 POWDER, FOR SOLUTION ORAL at 22:12

## 2017-08-17 RX ADMIN — SODIUM CHLORIDE 75 ML/HR: 9 INJECTION, SOLUTION INTRAVENOUS at 21:23

## 2017-08-17 RX ADMIN — ESCITALOPRAM 10 MG: 10 TABLET, FILM COATED ORAL at 10:08

## 2017-08-17 RX ADMIN — HYDROCORTISONE: 25 CREAM TOPICAL at 15:00

## 2017-08-17 RX ADMIN — HYDROCORTISONE ACETATE 25 MG: 25 SUPPOSITORY RECTAL at 21:03

## 2017-08-17 NOTE — PROGRESS NOTES
"Adult Nutrition  Assessment/PES    Patient Name:  Francisco Arana Sr.  YOB: 1940  MRN: [id number]  Admit Date:  8/17/2017    Assessment Date:  8/17/2017        Reason for Assessment       08/17/17 1028    Reason for Assessment    Reason For Assessment/Visit identified at risk by screening criteria    Identified At Risk By Screening Criteria large or nonhealing wound, burn or pressure ulcer;other (see comments);BMI   Gabriel score 8    Diagnosis Diagnosis    Gastrointestinal Gastrointestinal bleed                Anthropometrics       08/17/17 1029    Anthropometrics    RD Documented Weight on Admission 44.1 kg (97 lb 3.6 oz)    Anthropometrics (Special Considerations)    Height Used for Calculations 1.651 m (5' 5\")    Weight Used for Calculations 44.1 kg (97 lb 3.6 oz)    RD Calculated IBW --   136# or 61.8 kg    RD Calculated % IBW 71    RD Calculated BMI (kg/m2) 16.1    Ideal Body Weight (IBW)    % Ideal Body Weight Malnutrition 70-79% -moderate deficit    Usual Body Weight (UBW)    Weight Loss 19.5 kg (43 lb)    % Weight Loss  31 %    Weight Loss Time Frame 1 month    Body Mass Index (BMI)    BMI Grade 16 - 16.9 low grade II            Labs/Tests/Procedures/Meds       08/17/17 1034    Labs/Tests/Procedures/Meds    Labs/Tests Review Reviewed;Alb;Creat;Hgb Hct    Medication Review Reviewed, pertinent    Significant Vitals reviewed            Physical Findings       08/17/17 1035    Physical Findings/Assessment    Additional Documentation Physical Appearance (Group)    Physical Appearance    Gastrointestinal feeding tube    Tubes peg tube    Skin pressure ulcer(s);other (see comments)   unstageable PU coccyx; Gabriel score 8            Estimated/Assessed Needs       08/17/17 1036    Calculation Measurements    Weight Used For Calculations 61.8 kg (136 lb 3.9 oz)    Estimated/Assessed Energy Needs    Energy Need Method Kcal/kg    kcal/kg 30    30 Kcal/Kg (kcal) 1854    Estimated/Assessed Protein Needs    " Weight Used for Protein Calculation 61.8 kg (136 lb 3.9 oz)    Protein (gm/kg) 1.2    1.2 Gm Protein (gm) 74.16    Estimated/Assessed Fluid Needs    Fluid Need Method RDA method    RDA Method (mL)  1854            Nutrition Prescription Ordered       08/17/17 1037    Nutrition Prescription PO    Current PO Diet NPO            Evaluation of Received Nutrient/Fluid Intake       08/17/17 1038    Calculation Measurements    Weight Used For Calculations 44.1 kg (97 lb 3.6 oz)    Evaluation of Received Nutrient/Fluid Intake    Number of Days Evaluated 1 day    Nutrition Delivered Fluid Evaluation    Fluid Intake Evaluation    IV Fluid (mL) 1800    Total Fluid Intake (mL) 1800    Total Fluid Intake (mL/kg) 40.82 mL/kg    % Fluid Needs 97              Problem/Interventions:        Problem 1       08/17/17 1038    Nutrition Diagnoses Problem 1    Problem 1 Nutrition Appropriate for Condition at this Time    Etiology (related to) Medical Diagnosis    Gastrointestinal Gastrointestinal bleed    Signs/Symptoms (evidenced by) Report/Observation    Reported/Observed By MD                    Intervention Goal       08/17/17 1038    Intervention Goal    General Maintain nutrition;Nutrition support treatment    TF/PN Inititiate TF/PN    Weight No significant weight loss            Nutrition Intervention       08/17/17 1039    Nutrition Intervention    RD/Tech Action Follow Tx progress;Care plan reviewd              Education/Evaluation       08/17/17 1039    Education    Education Will Instruct as appropriate    Monitor/Evaluation    Monitor Per protocol        Comments:  Completed nutrition assessment triggered by low BMI, pressure ulcer, low Gabriel score.    Electronically signed by:  Tierra Dozier RD  08/17/17 10:41 AM

## 2017-08-17 NOTE — PLAN OF CARE
Problem: Patient Care Overview (Adult)  Goal: Plan of Care Review  Outcome: Ongoing (interventions implemented as appropriate)    08/17/17 0500   Coping/Psychosocial Response Interventions   Plan Of Care Reviewed With patient   Patient Care Overview   Progress no change   Outcome Evaluation   Outcome Summary/Follow up Plan Pt. arrived to unit from [facility] around 0300. Pt. VS WNL. C/o pain in groin and buttocks. Bottom red/excoriated, stage 2 pressure ulcer noted. Neglect form filled out and faxed, MD notified. Pt. having bright red blood y stools, MD aware. Serial H&Hs ordered, GI consulted. Pt. recieving IVFs. Pt. resting comfortably, will continue to monitor closely.       Goal: Adult Individualization and Mutuality  Outcome: Ongoing (interventions implemented as appropriate)  Goal: Discharge Needs Assessment  Outcome: Ongoing (interventions implemented as appropriate)    Problem: Gastrointestinal Bleeding (Adult)  Goal: Signs and Symptoms of Listed Potential Problems Will be Absent or Manageable (Gastrointestinal Bleeding)  Outcome: Ongoing (interventions implemented as appropriate)    Problem: Fluid Volume Deficit (Adult)  Goal: Identify Related Risk Factors and Signs and Symptoms  Outcome: Outcome(s) achieved Date Met:  08/17/17  Goal: Fluid/Electrolyte Balance  Outcome: Ongoing (interventions implemented as appropriate)  Goal: Comfort/Well Being  Outcome: Ongoing (interventions implemented as appropriate)    Problem: Fall Risk (Adult)  Goal: Identify Related Risk Factors and Signs and Symptoms  Outcome: Outcome(s) achieved Date Met:  08/17/17  Goal: Absence of Falls  Outcome: Ongoing (interventions implemented as appropriate)    Problem: Nutrition, Imbalanced: Inadequate Oral Intake (Adult)  Goal: Identify Related Risk Factors and Signs and Symptoms  Outcome: Outcome(s) achieved Date Met:  08/17/17  Goal: Improved Oral Intake  Outcome: Ongoing (interventions implemented as appropriate)  Goal:  Prevent Further Weight Loss  Outcome: Ongoing (interventions implemented as appropriate)

## 2017-08-17 NOTE — PROGRESS NOTES
Continued Stay Note  [facility]     Patient Name: Francisco Arana Sr.  MRN: [id number]  Today's Date: 8/17/2017    Admit Date: 8/17/2017          Discharge Plan       08/17/17 1131    Case Management/Social Work Plan    Plan [facility] skilled rehab    Patient/Family In Agreement With Plan other (see comments)    Additional Comments Erin/[facility] on site here and is following for discharge.       08/17/17 1004    Case Management/Social Work Plan    Plan Patient is from [facility]. Skilled, no bed hold , but facility plans to readmit him on  discharge.     Additional Comments IMM signed by patient. Patient sleeping. Spoke with daughter in law, Roseann Arana--[phone]. She states patient plans to [facility]  on discharge. Spoke with Judi at [facility]. Patient is from a skilled bed . No bed hold , but he can return ther e on discharge. Family has previously transported patient at discharge.. Packet started and left in [address] [place] [address], CCP office..........   CASSANDRA Lantigua              Discharge Codes     None            Jaquan Concepcion RN

## 2017-08-17 NOTE — PLAN OF CARE
Problem: Patient Care Overview (Adult)  Goal: Plan of Care Review  Outcome: Ongoing (interventions implemented as appropriate)    08/17/17 5339   Coping/Psychosocial Response Interventions   Plan Of Care Reviewed With patient   Patient Care Overview   Progress no change       Goal: Adult Individualization and Mutuality  Outcome: Ongoing (interventions implemented as appropriate)  Goal: Discharge Needs Assessment  Outcome: Ongoing (interventions implemented as appropriate)    Problem: Gastrointestinal Bleeding (Adult)  Goal: Signs and Symptoms of Listed Potential Problems Will be Absent or Manageable (Gastrointestinal Bleeding)  Outcome: Ongoing (interventions implemented as appropriate)    Problem: Fluid Volume Deficit (Adult)  Goal: Fluid/Electrolyte Balance  Outcome: Ongoing (interventions implemented as appropriate)  Goal: Comfort/Well Being  Outcome: Ongoing (interventions implemented as appropriate)    Problem: Fall Risk (Adult)  Goal: Absence of Falls  Outcome: Ongoing (interventions implemented as appropriate)
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
## 2017-08-17 NOTE — H&P
Name: Francisco Arana . ADMIT: 2017   : 1940  PCP: Kevin Villatoro MD    MRN: [id number] LOS: 0 days   AGE/SEX: 76 y.o. male  ROOM: 402/1     No chief complaint on file.  Chief complaint is lower GI bleed    Subjective   Mr. Arana is a 76 y.o. male who presents to [facility] complaining ofLower GI bleed    History of Present Illness  The patient is a 76-year-old male with a previous history for COPD, chronic hypercapnic and hypoxic respiratory failure on 2 L nasal cannula, dysphagia status post PEG tube placement who resides at [facility].  History is extremely limited to the patient as he is deaf.  We attempted to use the  computer and we were able to get some information from the patient but it was very difficult.    Evidently he has been having bright red blood per his rectum for the past 4 days.  He is also having rectal pain and has excoriation around his anus and scrotum.  He went to [facility] where labs revealed a hemoglobin of 12.2, platelet of 154 and mildly elevation of his LFTs.  He was transferred to our hospital for further workup and management.  Per report he was little hypotensive on route here 90s over 50s however his blood pressure has now increased to 135/90.  He does appear to be pale.  When he arrived he was actively hemorrhaging from his rectum with bright red blood without clots.  The patient says this is the first time it's ever happened to him and he does not take any blood thinners.  Past Medical History:   Diagnosis Date   • AAA (abdominal aortic aneurysm)    • Asthma    • COPD (chronic obstructive pulmonary disease)    • Deaf    • Dysphagia    • Emphysema lung    • History of herniorrhaphy    • Hypotension    • Neuralgia    • Pneumonia    • Pneumothorax    • Skin cancer     skin   • Trigeminal neuralgia      Past Surgical History:   Procedure Laterality Date   • CHEST TUBE INSERTION     • ENDOSCOPY WITH GASTROSTOMY TUBE INSERTION N/A  7/10/2016    Procedure: ESOPHAGOGASTRODUODENOSCOPY WITH GASTROSTOMY TUBE INSERTION;  Surgeon: Nigel Valente MD;  Location: [facility] ENDOSCOPY;  Service:    • HERNIA REPAIR       History reviewed. No pertinent family history.  Social History   Substance Use Topics   • Smoking status: Former Smoker   • Smokeless tobacco: None   • Alcohol use No     Prescriptions Prior to Admission   Medication Sig Dispense Refill Last Dose   • docusate (COLACE) 50 MG/5ML liquid 150 mg by Per G Tube route Daily.      • escitalopram (LEXAPRO) 10 MG tablet Take 10 mg by mouth Daily.      • guaiFENesin (ROBITUSSIN) 100 MG/5ML syrup Take 20 mg by mouth 2 (Two) Times a Day As Needed for Cough.      • ipratropium-albuterol (DUO-NEB) 0.5-2.5 mg/mL nebulizer Take 3 mL by nebulization Every 4 (Four) Hours As Needed for Wheezing or Shortness of Air.   7/17/2017 at 1600   • ipratropium-albuterol (DUO-NEB) 0.5-2.5 mg/mL nebulizer Take 3 mL by nebulization 4 (Four) Times a Day.      • NON FORMULARY Apply 1 application topically 2 (Two) Times a Day. Remedy Phytoplex Z-guard 575 topical pasta - apply thin layer to wound on coccyx and red area until healed      • Nutritional Supplements (JEVITY 1.5 KENAN) liquid 55 mL by Per G Tube route Continuous. 55 cc/hr      • terazosin (HYTRIN) 1 MG capsule 1 capsule by Per G Tube route Every 12 (Twelve) Hours. 60 capsule 1      Allergies:  Review of patient's allergies indicates no known allergies.    Review of Systems   Unable to perform ROS: Patient nonverbal        Objective    Vital Signs  Temp:  [97.8 °F (36.6 °C)] 97.8 °F (36.6 °C)  Heart Rate:  [81] 81  Resp:  [20] 20  BP: (135)/(90) 135/90  SpO2:  [96 %] 96 %  on  Flow (L/min):  [2] 2;   O2 Device: nasal cannula  Body mass index is 16.17 kg/(m^2).    Physical Exam   Constitutional: He appears well-developed and well-nourished.   Appears older than stated age, frail and chronically ill-appearing with temporal wasting and cachexia   HENT:   Head:  Normocephalic and atraumatic.   Mouth/Throat: Oropharynx is clear and moist. No oropharyngeal exudate.   Dry oropharynx   Eyes: Conjunctivae and EOM are normal. Pupils are equal, round, and reactive to light. No scleral icterus.   Neck: Normal range of motion. Neck supple. No JVD present. No thyromegaly present.   Cardiovascular: Normal rate, regular rhythm and normal heart sounds.  Exam reveals no gallop and no friction rub.    No murmur heard.  Pulmonary/Chest: Effort normal. No stridor. No respiratory distress. He has wheezes.   Abdominal: Soft. Bowel sounds are normal. He exhibits no distension. There is no tenderness. There is no rebound and no guarding.   G-tube site is clean and dry   Musculoskeletal: He exhibits no edema or tenderness.   Lymphadenopathy:     He has no cervical adenopathy.   Neurological: He is alert. No cranial nerve deficit.   Skin: Skin is warm and dry.   Psychiatric: He has a normal mood and affect. His behavior is normal.   Nursing note and vitals reviewed.      Results Review:   I reviewed the patient's new clinical results.    Results from last 7 days  Lab Units 08/17/17  0354   WBC 10*3/mm3 4.66   HEMOGLOBIN g/dL 12.1*   PLATELETS 10*3/mm3 149       Results from last 7 days  Lab Units 08/17/17  0354   SODIUM mmol/L 141   POTASSIUM mmol/L 4.4   CHLORIDE mmol/L 102   CO2 mmol/L 29.8*   BUN mg/dL 23   CREATININE mg/dL 0.54*   GLUCOSE mg/dL 98   Estimated Creatinine Clearance: 47.5 mL/min (by C-G formula based on Cr of 0.54).    Results from last 7 days  Lab Units 08/17/17  0354   INR  1.10           No orders to display     Assessment/Plan   Assessment:     Active Hospital Problems (** Indicates Principal Problem)    Diagnosis Date Noted   • **Lower GI bleed [K92.2] 08/17/2017   • Chronic respiratory failure with hypoxia and hypercapnia [J96.11, J96.12] 08/17/2017   • Bilateral deafness [H91.93] 08/17/2017   • COPD (chronic obstructive pulmonary disease) [J44.9] 08/17/2017   • Dysphagia  [R13.10] 08/17/2017   • S/P percutaneous endoscopic gastrostomy (PEG) tube placement [Z93.1] 08/17/2017      Resolved Hospital Problems    Diagnosis Date Noted Date Resolved   No resolved problems to display.         Plan:       The patient was transferred to our service for lower GI bleeding.  I'm concerned as he was having active bleeding when he arrived and blood pressure is 90s systolic en route.  It appears the bleeding has stopped at this time and I've ordered a stat hemoglobin.  Consult GI.  I think the most likely cause would be either be hemorrhoidal bleed or diverticulosis.  We'll continue to trend his hemoglobin and if he becomes hypotensive, symptomatic or hemoglobin has dramatically dropped and we will transfuse.  Recheck a CMP as she had mildly elevated LFTs at the outside hospital.  The patient is deaf which makes history very difficult.  We'll keep him nothing by mouth for now.  Continue his nebulizer for COPD and supplemental oxygen.    Per report the patient is a full code.  He does not have a healthcare surrogate designated but his son, Shade, is one of his emergency contacts.    I discussed the patients findings and my recommendations with patient and nursing staff.          Elijah Aguirre MD  [facility]  08/17/17  5:45 AM

## 2017-08-17 NOTE — CONSULTS
[facility]  Initial Inpatient Consult Note    Referring Provider: Dr Kevin Villatoro    Reason for Consultation: rectal bleeding    Subjective     History of present illness:    76 y.o. male previously seen by Dr. Nigel Valente with G-tube placement on July 10.  Patient is deaf and difficult to obtain history, chart records indicate 4 day history of rectal bleeding and complaints of rectal pain.  Discussed with RN, did have some evidence of bright red blood per rectum overnight but none this exam.  Hemoglobin on July 18 was 11.4 current hemoglobin is 12.1.  No records of previous colonoscopic evaluation, no anticoagulant therapy noted.  He was noted to have elevated LFTs, slightly increased from levels checked last month.    Past Medical History:  Past Medical History:   Diagnosis Date   • AAA (abdominal aortic aneurysm)    • Asthma    • COPD (chronic obstructive pulmonary disease)    • Deaf    • Dysphagia    • Emphysema lung    • History of herniorrhaphy    • Hypotension    • Neuralgia    • Pneumonia    • Pneumothorax    • Skin cancer     skin   • Trigeminal neuralgia      Past Surgical History:  Past Surgical History:   Procedure Laterality Date   • CHEST TUBE INSERTION     • ENDOSCOPY WITH GASTROSTOMY TUBE INSERTION N/A 7/10/2016    Procedure: ESOPHAGOGASTRODUODENOSCOPY WITH GASTROSTOMY TUBE INSERTION;  Surgeon: Nigel Valente MD;  Location: [facility];  Service:    • HERNIA REPAIR        Social History:   Social History   Substance Use Topics   • Smoking status: Former Smoker   • Smokeless tobacco: Not on file   • Alcohol use No      Family History:  History reviewed. No pertinent family history.    Home Meds:  Prescriptions Prior to Admission   Medication Sig Dispense Refill Last Dose   • docusate (COLACE) 50 MG/5ML liquid 150 mg by Per G Tube route Daily.      • escitalopram (LEXAPRO) 10 MG tablet Take 10 mg by mouth Daily.      • guaiFENesin (ROBITUSSIN) 100 MG/5ML syrup Take 20  mg by mouth 2 (Two) Times a Day As Needed for Cough.      • ipratropium-albuterol (DUO-NEB) 0.5-2.5 mg/mL nebulizer Take 3 mL by nebulization Every 4 (Four) Hours As Needed for Wheezing or Shortness of Air.   7/17/2017 at 1600   • ipratropium-albuterol (DUO-NEB) 0.5-2.5 mg/mL nebulizer Take 3 mL by nebulization 4 (Four) Times a Day.      • NON FORMULARY Apply 1 application topically 2 (Two) Times a Day. Remedy Phytoplex Z-guard 575 topical pasta - apply thin layer to wound on coccyx and red area until healed      • Nutritional Supplements (JEVITY 1.5 KENAN) liquid 55 mL by Per G Tube route Continuous. 55 cc/hr      • terazosin (HYTRIN) 1 MG capsule 1 capsule by Per G Tube route Every 12 (Twelve) Hours. 60 capsule 1      Current Meds:     escitalopram 10 mg Oral Daily   terazosin 1 mg Per G Tube Q12H     Allergies:  No Known Allergies  Review of Systems  Review of systems could not be obtained due to   patient nonverbal.     Objective     Vital Signs  Temp:  [97.8 °F (36.6 °C)] 97.8 °F (36.6 °C)  Heart Rate:  [81] 81  Resp:  [20] 20  BP: (135)/(90) 135/90  Physical Exam:  General Appearance:    Alert, cooperative, in no acute distress   Head:    Normocephalic, without obvious abnormality, atraumatic   Eyes:            Lids and lashes normal, conjunctivae and sclerae normal, no   icterus   Throat:   No oral lesions, no thrush, oral mucosa moist   Neck:   No adenopathy, supple, trachea midline, no thyromegaly, no   carotid bruit, no JVD   Lungs:     Clear to auscultation,respirations regular, even and                   unlabored    Heart:    Regular rhythm and normal rate, normal S1 and S2, no            murmur, no gallop, no rub, no click   Chest Wall:    No abnormalities observed   Abdomen:     Normal bowel sounds, no masses, no organomegaly, soft        non-tender, non-distended, no guarding, no rebound                 tenderness   Rectal:     Deferred   Extremities:   no edema, no cyanosis, no redness   Skin:   No  bleeding, bruising or rash   Lymph nodes:   No palpable adenopathy   Psychiatric:  Judgement and insight: normal   Orientation to person place and time: normal   Mood and affect: normal   Results Review:   I reviewed the patient's new clinical results.      Results from last 7 days  Lab Units 08/17/17  0354   WBC 10*3/mm3 4.66   HEMOGLOBIN g/dL 12.1*   HEMATOCRIT % 38.1*   PLATELETS 10*3/mm3 149       Results from last 7 days  Lab Units 08/17/17  0354   SODIUM mmol/L 140  141   POTASSIUM mmol/L 4.5  4.4   CHLORIDE mmol/L 101  102   CO2 mmol/L 28.6  29.8*   BUN mg/dL 22  23   CREATININE mg/dL 0.54*  0.54*   CALCIUM mg/dL 8.6  8.7   BILIRUBIN mg/dL 0.7   ALK PHOS U/L 143*   ALT (SGPT) U/L 116*   AST (SGOT) U/L 78*   GLUCOSE mg/dL 98  98       Results from last 7 days  Lab Units 08/17/17  0354   INR  1.10     No results found for: LIPASE    Radiology:  No orders to display       Assessment/Plan   Patient Active Problem List   Diagnosis   • Acute respiratory failure with hypoxia and hypercapnia   • Acute respiratory failure with hypoxia   • Lower GI bleed   • Chronic respiratory failure with hypoxia and hypercapnia   • Bilateral deafness   • COPD (chronic obstructive pulmonary disease)   • Dysphagia   • S/P percutaneous endoscopic gastrostomy (PEG) tube placement     Impression  #1 bright red blood per rectum: Suspect hemorrhoidal etiology, cannot completely discount diverticular source or angiodysplastic origin.  #2 anemia: Awaiting repeat labs to define the trend.  #3 dysphagia: Status post PEG placement  #4 COPD  #5 Deaf    Recommendation  Await repeat labs  Address local inflammatory issues with hemorrhoidal cream and suppositories  Entertain eventual endoscopic assessment pending his response and after discussion with power of   I discussed the patients findings and my recommendations with patient and nursing staff.    Aneesh Han MD

## 2017-08-17 NOTE — NURSING NOTE
WOCN consult: Pressure Injury present on admission . Coccyx unstagable     08/17/17 0953   Pressure Ulcer 08/17/17 coccyx unstageable   Date first assessed: 08/17/17   Present On Admission (Pressure Ulcer): yes  Location: coccyx  Stage: unstageable   Dressing Appearance no drainage   Pressure Ulcer Appearance reddened;yellow   Periwound Area redness  (slow to juanita)   Length (Pressure Ulcer) (cm) 1   Width (Pressure Ulcer) (cm) 1   Pressure Ulcer Wound Care cleansed with  (no rinse cleansers)   Dressing Dressing removed;Dressing applied;hydrocolloid   Low air loss mattress ordered. Heels slow to juanita. Requested heel boots ordered.  Turn every 2 hours.Limit time up in chair.

## 2017-08-17 NOTE — PROGRESS NOTES
Continued Stay Note  [facility]     Patient Name: Francisco Arana Sr.  MRN: [id number]  Today's Date: 8/17/2017    Admit Date: 8/17/2017          Discharge Plan       08/17/17 1004    Case Management/Social Work Plan    Plan Patient is from [facility]. Skilled, no bed hold , but facility plans to readmit him on  discharge.     Additional Comments IMM signed by patient. Patient sleeping. Spoke with daughter in law, Roseann Arana--[phone]. She states patient plans to [facility]  on discharge. Spoke with Judi at [facility]. Patient is from a skilled bed . No bed hold , but he can return there e on discharge. Family has previously transported patient at discharge.. Packet started and left in [address] [place] [address], [facility] office..........   CASSANDRA Lantigua              Discharge Codes     None            CASSANDRA Lantigua

## 2017-08-17 NOTE — PROGRESS NOTES
Malnutrition Severity Assessment    Patient Name:  Francisco Arana Sr.  YOB: 1940  MRN: [id number]  Admit Date:  8/17/2017    Patient meets criteria for : Severe malnutrition    Comments:  Patient with a history of COPD. Severe fat loss seen by temporal wasting, iliac crest prominent, ribs visible. BMI 16.1, 71% ideal body weight, 43# weight loss (31%) x 1 month.      Malnutrition Type: Chronic Illness Malnutrition     Malnutrition Type (last 8 hours)      Malnutrition Severity Assessment       08/17/17 1209    Physical Signs of Malnutrition (Chronic)    Fat Loss Severe      08/17/17 1209    Malnutrition Severity Assessment    Malnutrition Type Chronic Illness Malnutrition          Physical Signs of Malnutrition         Most Recent Value    Fat Loss  Severe      Weight Status         Most Recent Value    BMI  Mod (<17)    %IBW  Mod <80%    Weight Loss  Severe (>5% / 1 mo)              Electronically signed by:  Tierra Dozier RD  08/17/17 12:14 PM

## 2017-08-17 NOTE — PROGRESS NOTES
Continued Stay Note  [facility]     Patient Name: Francisco Arana Sr.  MRN: [id number]  Today's Date: 8/17/2017    Admit Date: 8/17/2017          Discharge Plan       08/17/17 1443    Case Management/Social Work Plan    Plan [facility].-skilled     Additional Comments  Phone message received from APS supervisor, Analilia . Case has been assigned  to Veena Hansen with [facility] ---[phone]. Called Ms. Hansen,  and explained that  the APS report was  filed by night shift nurse and skin condition etc is not  new.  Patient came from  home 2 weeks ago and was placed at [facility] from the  hospital. . Family agreeable with patient's return to [facility]   at discharge.  Worker to contact [facility] for patient's  admission paperwork and  will decide  how to proceed. CCP to follow for patient's return to [facility]  on discharge               Discharge Codes     None            CASSANDRA Lantigua

## 2017-08-18 ENCOUNTER — ANESTHESIA EVENT (OUTPATIENT)
Dept: GASTROENTEROLOGY | Facility: HOSPITAL | Age: 77
End: 2017-08-18

## 2017-08-18 ENCOUNTER — APPOINTMENT (OUTPATIENT)
Dept: GENERAL RADIOLOGY | Facility: HOSPITAL | Age: 77
End: 2017-08-18

## 2017-08-18 ENCOUNTER — ANESTHESIA (OUTPATIENT)
Dept: GASTROENTEROLOGY | Facility: HOSPITAL | Age: 77
End: 2017-08-18

## 2017-08-18 PROBLEM — E46 MALNUTRITION (HCC): Status: ACTIVE | Noted: 2017-08-18

## 2017-08-18 LAB
BASOPHILS # BLD AUTO: 0.02 10*3/MM3 (ref 0–0.2)
BASOPHILS NFR BLD AUTO: 0.4 % (ref 0–1.5)
DEPRECATED RDW RBC AUTO: 50.3 FL (ref 37–54)
EOSINOPHIL # BLD AUTO: 0.01 10*3/MM3 (ref 0–0.7)
EOSINOPHIL NFR BLD AUTO: 0.2 % (ref 0.3–6.2)
ERYTHROCYTE [DISTWIDTH] IN BLOOD BY AUTOMATED COUNT: 12.8 % (ref 11.5–14.5)
HCT VFR BLD AUTO: 30.9 % (ref 40.4–52.2)
HCT VFR BLD AUTO: 34 % (ref 40.4–52.2)
HCT VFR BLD AUTO: 35.2 % (ref 40.4–52.2)
HGB BLD-MCNC: 10.9 G/DL (ref 13.7–17.6)
HGB BLD-MCNC: 10.9 G/DL (ref 13.7–17.6)
HGB BLD-MCNC: 9.6 G/DL (ref 13.7–17.6)
IMM GRANULOCYTES # BLD: 0 10*3/MM3 (ref 0–0.03)
IMM GRANULOCYTES NFR BLD: 0 % (ref 0–0.5)
LYMPHOCYTES # BLD AUTO: 0.36 10*3/MM3 (ref 0.9–4.8)
LYMPHOCYTES NFR BLD AUTO: 8 % (ref 19.6–45.3)
MCH RBC QN AUTO: 34.5 PG (ref 27–32.7)
MCHC RBC AUTO-ENTMCNC: 32.1 G/DL (ref 32.6–36.4)
MCV RBC AUTO: 107.6 FL (ref 79.8–96.2)
MONOCYTES # BLD AUTO: 0.35 10*3/MM3 (ref 0.2–1.2)
MONOCYTES NFR BLD AUTO: 7.8 % (ref 5–12)
NEUTROPHILS # BLD AUTO: 3.75 10*3/MM3 (ref 1.9–8.1)
NEUTROPHILS NFR BLD AUTO: 83.6 % (ref 42.7–76)
PLATELET # BLD AUTO: 162 10*3/MM3 (ref 140–500)
PMV BLD AUTO: 9.7 FL (ref 6–12)
RBC # BLD AUTO: 3.16 10*6/MM3 (ref 4.6–6)
WBC NRBC COR # BLD: 4.49 10*3/MM3 (ref 4.5–10.7)

## 2017-08-18 PROCEDURE — 94640 AIRWAY INHALATION TREATMENT: CPT

## 2017-08-18 PROCEDURE — 71010 HC CHEST PA OR AP: CPT

## 2017-08-18 PROCEDURE — 85025 COMPLETE CBC W/AUTO DIFF WBC: CPT | Performed by: INTERNAL MEDICINE

## 2017-08-18 PROCEDURE — G0378 HOSPITAL OBSERVATION PER HR: HCPCS

## 2017-08-18 PROCEDURE — 25010000002 PHENYLEPHRINE PER 1 ML: Performed by: ANESTHESIOLOGY

## 2017-08-18 PROCEDURE — 94799 UNLISTED PULMONARY SVC/PX: CPT

## 2017-08-18 PROCEDURE — 85014 HEMATOCRIT: CPT | Performed by: INTERNAL MEDICINE

## 2017-08-18 PROCEDURE — 25010000002 ONDANSETRON PER 1 MG: Performed by: INTERNAL MEDICINE

## 2017-08-18 PROCEDURE — 25010000002 PROPOFOL 10 MG/ML EMULSION: Performed by: ANESTHESIOLOGY

## 2017-08-18 PROCEDURE — 45378 DIAGNOSTIC COLONOSCOPY: CPT | Performed by: INTERNAL MEDICINE

## 2017-08-18 PROCEDURE — 96374 THER/PROPH/DIAG INJ IV PUSH: CPT

## 2017-08-18 PROCEDURE — 85018 HEMOGLOBIN: CPT | Performed by: INTERNAL MEDICINE

## 2017-08-18 RX ORDER — POLYETHYLENE GLYCOL 3350 17 G/17G
17 POWDER, FOR SOLUTION ORAL DAILY
Status: DISCONTINUED | OUTPATIENT
Start: 2017-08-19 | End: 2017-08-22 | Stop reason: HOSPADM

## 2017-08-18 RX ORDER — PROPOFOL 10 MG/ML
VIAL (ML) INTRAVENOUS AS NEEDED
Status: DISCONTINUED | OUTPATIENT
Start: 2017-08-18 | End: 2017-08-18 | Stop reason: SURG

## 2017-08-18 RX ORDER — LIDOCAINE HYDROCHLORIDE 20 MG/ML
INJECTION, SOLUTION INFILTRATION; PERINEURAL AS NEEDED
Status: DISCONTINUED | OUTPATIENT
Start: 2017-08-18 | End: 2017-08-18 | Stop reason: SURG

## 2017-08-18 RX ORDER — PROPOFOL 10 MG/ML
VIAL (ML) INTRAVENOUS CONTINUOUS PRN
Status: DISCONTINUED | OUTPATIENT
Start: 2017-08-18 | End: 2017-08-18 | Stop reason: SURG

## 2017-08-18 RX ORDER — SODIUM CHLORIDE, SODIUM LACTATE, POTASSIUM CHLORIDE, CALCIUM CHLORIDE 600; 310; 30; 20 MG/100ML; MG/100ML; MG/100ML; MG/100ML
30 INJECTION, SOLUTION INTRAVENOUS CONTINUOUS PRN
Status: DISCONTINUED | OUTPATIENT
Start: 2017-08-18 | End: 2017-08-18

## 2017-08-18 RX ADMIN — HYDROCORTISONE: 25 CREAM TOPICAL at 09:57

## 2017-08-18 RX ADMIN — EPHEDRINE SULFATE 10 MG: 50 INJECTION INTRAMUSCULAR; INTRAVENOUS; SUBCUTANEOUS at 16:11

## 2017-08-18 RX ADMIN — HYDROCORTISONE: 25 CREAM TOPICAL at 18:19

## 2017-08-18 RX ADMIN — PHENYLEPHRINE HYDROCHLORIDE 200 MCG: 10 INJECTION INTRAVENOUS at 16:11

## 2017-08-18 RX ADMIN — CALCIUM ACETATE MONOHYDRATE AND ALUMINUM SULFATE TETRADECAHYDRATE 1 PACKET: 952; 1347 POWDER, FOR SOLUTION TOPICAL at 20:50

## 2017-08-18 RX ADMIN — ONDANSETRON 4 MG: 2 INJECTION INTRAMUSCULAR; INTRAVENOUS at 05:20

## 2017-08-18 RX ADMIN — EPHEDRINE SULFATE 5 MG: 50 INJECTION INTRAMUSCULAR; INTRAVENOUS; SUBCUTANEOUS at 16:15

## 2017-08-18 RX ADMIN — PHENYLEPHRINE HYDROCHLORIDE 200 MCG: 10 INJECTION INTRAVENOUS at 16:52

## 2017-08-18 RX ADMIN — PHENYLEPHRINE HYDROCHLORIDE 200 MCG: 10 INJECTION INTRAVENOUS at 16:26

## 2017-08-18 RX ADMIN — PHENYLEPHRINE HYDROCHLORIDE 200 MCG: 10 INJECTION INTRAVENOUS at 16:42

## 2017-08-18 RX ADMIN — LIDOCAINE HYDROCHLORIDE 20 MG: 20 INJECTION, SOLUTION INFILTRATION; PERINEURAL at 15:59

## 2017-08-18 RX ADMIN — SODIUM CHLORIDE 75 ML/HR: 9 INJECTION, SOLUTION INTRAVENOUS at 18:13

## 2017-08-18 RX ADMIN — EPHEDRINE SULFATE 10 MG: 50 INJECTION INTRAMUSCULAR; INTRAVENOUS; SUBCUTANEOUS at 16:52

## 2017-08-18 RX ADMIN — IPRATROPIUM BROMIDE AND ALBUTEROL SULFATE 3 ML: .5; 3 SOLUTION RESPIRATORY (INHALATION) at 23:54

## 2017-08-18 RX ADMIN — SODIUM CHLORIDE, POTASSIUM CHLORIDE, SODIUM LACTATE AND CALCIUM CHLORIDE: 600; 310; 30; 20 INJECTION, SOLUTION INTRAVENOUS at 15:54

## 2017-08-18 RX ADMIN — EPHEDRINE SULFATE 10 MG: 50 INJECTION INTRAMUSCULAR; INTRAVENOUS; SUBCUTANEOUS at 16:03

## 2017-08-18 RX ADMIN — PHENYLEPHRINE HYDROCHLORIDE 200 MCG: 10 INJECTION INTRAVENOUS at 16:15

## 2017-08-18 RX ADMIN — IPRATROPIUM BROMIDE AND ALBUTEROL SULFATE 3 ML: .5; 3 SOLUTION RESPIRATORY (INHALATION) at 07:05

## 2017-08-18 RX ADMIN — PHENYLEPHRINE HYDROCHLORIDE 200 MCG: 10 INJECTION INTRAVENOUS at 16:35

## 2017-08-18 RX ADMIN — HYDROCORTISONE ACETATE 25 MG: 25 SUPPOSITORY RECTAL at 18:19

## 2017-08-18 RX ADMIN — SODIUM CHLORIDE 30 MG/ML INHALATION SOLUTION 4 ML: 30 SOLUTION INHALANT at 00:16

## 2017-08-18 RX ADMIN — IPRATROPIUM BROMIDE AND ALBUTEROL SULFATE 3 ML: .5; 3 SOLUTION RESPIRATORY (INHALATION) at 20:52

## 2017-08-18 RX ADMIN — PROPOFOL 25 MG: 10 INJECTION, EMULSION INTRAVENOUS at 16:20

## 2017-08-18 RX ADMIN — IPRATROPIUM BROMIDE AND ALBUTEROL SULFATE 3 ML: .5; 3 SOLUTION RESPIRATORY (INHALATION) at 00:11

## 2017-08-18 RX ADMIN — IPRATROPIUM BROMIDE AND ALBUTEROL SULFATE 3 ML: .5; 3 SOLUTION RESPIRATORY (INHALATION) at 13:02

## 2017-08-18 RX ADMIN — TERAZOSIN HYDROCHLORIDE 1 MG: 1 CAPSULE ORAL at 09:57

## 2017-08-18 RX ADMIN — HYDROCORTISONE ACETATE 25 MG: 25 SUPPOSITORY RECTAL at 09:57

## 2017-08-18 RX ADMIN — TERAZOSIN HYDROCHLORIDE 1 MG: 1 CAPSULE ORAL at 20:49

## 2017-08-18 RX ADMIN — POLYETHYLENE GLYCOL 3350, SODIUM CHLORIDE, POTASSIUM CHLORIDE, SODIUM BICARBONATE, AND SODIUM SULFATE 2000 ML: 240; 5.84; 2.98; 6.72; 22.72 POWDER, FOR SOLUTION ORAL at 04:07

## 2017-08-18 RX ADMIN — SODIUM CHLORIDE, POTASSIUM CHLORIDE, SODIUM LACTATE AND CALCIUM CHLORIDE 30 ML/HR: 600; 310; 30; 20 INJECTION, SOLUTION INTRAVENOUS at 15:06

## 2017-08-18 RX ADMIN — PHENYLEPHRINE HYDROCHLORIDE 200 MCG: 10 INJECTION INTRAVENOUS at 16:21

## 2017-08-18 RX ADMIN — PROPOFOL 300 MCG/KG/MIN: 10 INJECTION, EMULSION INTRAVENOUS at 15:59

## 2017-08-18 RX ADMIN — PROPOFOL 75 MG: 10 INJECTION, EMULSION INTRAVENOUS at 15:59

## 2017-08-18 RX ADMIN — PHENYLEPHRINE HYDROCHLORIDE 200 MCG: 10 INJECTION INTRAVENOUS at 16:06

## 2017-08-18 RX ADMIN — ESCITALOPRAM 10 MG: 10 TABLET, FILM COATED ORAL at 09:57

## 2017-08-18 NOTE — NURSING NOTE
Spoke with Dr. Juárez regarding RN unable to get post op temperature in endoscopy. MD states OK to transfer patient back to room at this time.

## 2017-08-18 NOTE — PLAN OF CARE
Problem: Patient Care Overview (Adult)  Goal: Discharge Needs Assessment  Outcome: Ongoing (interventions implemented as appropriate)    08/18/17 1453   Discharge Needs Assessment   Concerns To Be Addressed no discharge needs identified   Discharge Disposition home or self-care   Living Environment   Transportation Available car         Problem: GI Endoscopy (Adult)  Goal: Signs and Symptoms of Listed Potential Problems Will be Absent or Manageable (GI Endoscopy)  Outcome: Ongoing (interventions implemented as appropriate)    08/18/17 1453   GI Endoscopy   Problems Assessed (GI Endoscopy) all   Problems Present (GI Endoscopy) none

## 2017-08-18 NOTE — ANESTHESIA POSTPROCEDURE EVALUATION
Patient: Francisco Arana Sr.    Procedure Summary     Date Anesthesia Start Anesthesia Stop Room / Location    08/18/17 1554 1701  [facility] /  [facility]       Procedure Diagnosis Surgeon Provider    COLONOSCOPY TO CECUM (N/A ) Lower GI bleed  (Lower GI bleed [K92.2]) MD Julius Ugarte MD          Anesthesia Type: MAC  Last vitals  BP        Temp        Pulse       Resp        SpO2          Post Anesthesia Care and Evaluation    Patient location during evaluation: PHASE II  Patient participation: complete - patient participated  Level of consciousness: awake and alert  Pain management: adequate  Airway patency: patent  Anesthetic complications: No anesthetic complications  PONV Status: none  Cardiovascular status: acceptable  Respiratory status: acceptable  Hydration status: acceptable

## 2017-08-18 NOTE — PROGRESS NOTES
Continued Stay Note  [facility]     Patient Name: Francisco Arana Sr.  MRN: [id number]  Today's Date: 8/18/2017    Admit Date: 8/17/2017          Discharge Plan       08/18/17 1247    Case Management/Social Work Plan    Plan [facility] skilled will need [employer] precert    Patient/Family In Agreement With Plan yes    Additional Comments Janine/[facility] Sumit has been here and is following for discharge. Patient will need a [employer] precert to return. Packet is in the [facility] office with numbers for report and fax.               Discharge Codes     None            Jaquan Concepcion RN

## 2017-08-18 NOTE — ANESTHESIA PREPROCEDURE EVALUATION
Anesthesia Evaluation     Patient summary reviewed and Nursing notes reviewed          Airway   Mallampati: II  TM distance: >3 FB  Neck ROM: full  no difficulty expected  Dental - normal exam     Pulmonary - normal exam   (+) a smoker Former, COPD, asthma,     ROS comment: Acute respiratory failure with hypoxia and hypercapnia  Cardiovascular - normal exam    (+) PVD,     ROS comment: AAA    Neuro/Psych  (+) numbness,      ROS Comment: Bilateral deafness/trigeminal neuralgia  GI/Hepatic/Renal/Endo - negative ROS     Musculoskeletal (-) negative ROS    Abdominal  - normal exam   Substance History - negative use     OB/GYN negative ob/gyn ROS         Other          Other Comment: Malnutrition/hx of PEG      Phys Exam Other: Cachectic/has a PEG                        Anesthesia Plan    ASA 3     MAC     intravenous induction   Anesthetic plan and risks discussed with patient.

## 2017-08-18 NOTE — PLAN OF CARE
Problem: Patient Care Overview (Adult)  Goal: Plan of Care Review  Outcome: Ongoing (interventions implemented as appropriate)    08/18/17 0320   Coping/Psychosocial Response Interventions   Plan Of Care Reviewed With patient   Patient Care Overview   Progress no change   Outcome Evaluation   Outcome Summary/Follow up Plan VSS, NS @ 75ml/hr, bowel prep golytely, continued to have bloody stool, Hgb trending down from 11.4 to 10.9, colonoscopy scheduled for tomorrow, will continue to monitor       Goal: Adult Individualization and Mutuality  Outcome: Ongoing (interventions implemented as appropriate)  Goal: Discharge Needs Assessment  Outcome: Ongoing (interventions implemented as appropriate)    Problem: Gastrointestinal Bleeding (Adult)  Goal: Signs and Symptoms of Listed Potential Problems Will be Absent or Manageable (Gastrointestinal Bleeding)  Outcome: Ongoing (interventions implemented as appropriate)    Problem: Fluid Volume Deficit (Adult)  Goal: Fluid/Electrolyte Balance  Outcome: Ongoing (interventions implemented as appropriate)  Goal: Comfort/Well Being  Outcome: Ongoing (interventions implemented as appropriate)    Problem: Fall Risk (Adult)  Goal: Absence of Falls  Outcome: Ongoing (interventions implemented as appropriate)    Problem: Nutrition, Imbalanced: Inadequate Oral Intake (Adult)  Goal: Improved Oral Intake  Outcome: Ongoing (interventions implemented as appropriate)  Goal: Prevent Further Weight Loss  Outcome: Ongoing (interventions implemented as appropriate)

## 2017-08-18 NOTE — PLAN OF CARE
Problem: COPD, Chronic Bronchitis/Emphysema (Adult)  Intervention: Optimize Hydration/Nutrition  Will initiate tube feeding as medical condition allows    08/18/17 1219   Nutrition Interventions   Oral Nutrition Promotion other (see comments)

## 2017-08-19 LAB
ANION GAP SERPL CALCULATED.3IONS-SCNC: 12.6 MMOL/L
BASOPHILS # BLD AUTO: 0 10*3/MM3 (ref 0–0.2)
BASOPHILS NFR BLD AUTO: 0 % (ref 0–1.5)
BUN BLD-MCNC: 19 MG/DL (ref 8–23)
BUN/CREAT SERPL: 48.7 (ref 7–25)
CALCIUM SPEC-SCNC: 7.7 MG/DL (ref 8.6–10.5)
CHLORIDE SERPL-SCNC: 106 MMOL/L (ref 98–107)
CO2 SERPL-SCNC: 25.4 MMOL/L (ref 22–29)
CREAT BLD-MCNC: 0.39 MG/DL (ref 0.76–1.27)
DEPRECATED RDW RBC AUTO: 50.4 FL (ref 37–54)
EOSINOPHIL # BLD AUTO: 0.01 10*3/MM3 (ref 0–0.7)
EOSINOPHIL NFR BLD AUTO: 0.4 % (ref 0.3–6.2)
ERYTHROCYTE [DISTWIDTH] IN BLOOD BY AUTOMATED COUNT: 12.8 % (ref 11.5–14.5)
GFR SERPL CREATININE-BSD FRML MDRD: >150 ML/MIN/1.73
GLUCOSE BLD-MCNC: 50 MG/DL (ref 65–99)
GLUCOSE BLDC GLUCOMTR-MCNC: 114 MG/DL (ref 70–130)
GLUCOSE BLDC GLUCOMTR-MCNC: 59 MG/DL (ref 70–130)
GLUCOSE BLDC GLUCOMTR-MCNC: 72 MG/DL (ref 70–130)
GLUCOSE BLDC GLUCOMTR-MCNC: 73 MG/DL (ref 70–130)
HCT VFR BLD AUTO: 28.5 % (ref 40.4–52.2)
HCT VFR BLD AUTO: 28.9 % (ref 40.4–52.2)
HCT VFR BLD AUTO: 29.2 % (ref 40.4–52.2)
HCT VFR BLD AUTO: 29.3 % (ref 40.4–52.2)
HGB BLD-MCNC: 9 G/DL (ref 13.7–17.6)
HGB BLD-MCNC: 9.1 G/DL (ref 13.7–17.6)
HGB BLD-MCNC: 9.2 G/DL (ref 13.7–17.6)
HGB BLD-MCNC: 9.4 G/DL (ref 13.7–17.6)
IMM GRANULOCYTES # BLD: 0.02 10*3/MM3 (ref 0–0.03)
IMM GRANULOCYTES NFR BLD: 0.7 % (ref 0–0.5)
LYMPHOCYTES # BLD AUTO: 0.39 10*3/MM3 (ref 0.9–4.8)
LYMPHOCYTES NFR BLD AUTO: 14 % (ref 19.6–45.3)
MCH RBC QN AUTO: 34.4 PG (ref 27–32.7)
MCHC RBC AUTO-ENTMCNC: 31.6 G/DL (ref 32.6–36.4)
MCV RBC AUTO: 108.8 FL (ref 79.8–96.2)
MONOCYTES # BLD AUTO: 0.26 10*3/MM3 (ref 0.2–1.2)
MONOCYTES NFR BLD AUTO: 9.4 % (ref 5–12)
NEUTROPHILS # BLD AUTO: 2.1 10*3/MM3 (ref 1.9–8.1)
NEUTROPHILS NFR BLD AUTO: 75.5 % (ref 42.7–76)
PLATELET # BLD AUTO: 136 10*3/MM3 (ref 140–500)
PMV BLD AUTO: 9.7 FL (ref 6–12)
POTASSIUM BLD-SCNC: 4 MMOL/L (ref 3.5–5.2)
RBC # BLD AUTO: 2.62 10*6/MM3 (ref 4.6–6)
SODIUM BLD-SCNC: 144 MMOL/L (ref 136–145)
WBC NRBC COR # BLD: 2.78 10*3/MM3 (ref 4.5–10.7)

## 2017-08-19 PROCEDURE — G0378 HOSPITAL OBSERVATION PER HR: HCPCS

## 2017-08-19 PROCEDURE — 82962 GLUCOSE BLOOD TEST: CPT

## 2017-08-19 PROCEDURE — 85018 HEMOGLOBIN: CPT | Performed by: INTERNAL MEDICINE

## 2017-08-19 PROCEDURE — 85014 HEMATOCRIT: CPT | Performed by: INTERNAL MEDICINE

## 2017-08-19 PROCEDURE — 80048 BASIC METABOLIC PNL TOTAL CA: CPT | Performed by: HOSPITALIST

## 2017-08-19 PROCEDURE — 99231 SBSQ HOSP IP/OBS SF/LOW 25: CPT | Performed by: INTERNAL MEDICINE

## 2017-08-19 PROCEDURE — 85025 COMPLETE CBC W/AUTO DIFF WBC: CPT | Performed by: INTERNAL MEDICINE

## 2017-08-19 PROCEDURE — 94799 UNLISTED PULMONARY SVC/PX: CPT

## 2017-08-19 RX ADMIN — CALCIUM ACETATE MONOHYDRATE AND ALUMINUM SULFATE TETRADECAHYDRATE 1 PACKET: 952; 1347 POWDER, FOR SOLUTION TOPICAL at 06:06

## 2017-08-19 RX ADMIN — IPRATROPIUM BROMIDE AND ALBUTEROL SULFATE 3 ML: .5; 3 SOLUTION RESPIRATORY (INHALATION) at 19:36

## 2017-08-19 RX ADMIN — IPRATROPIUM BROMIDE AND ALBUTEROL SULFATE 3 ML: .5; 3 SOLUTION RESPIRATORY (INHALATION) at 07:29

## 2017-08-19 RX ADMIN — IPRATROPIUM BROMIDE AND ALBUTEROL SULFATE 3 ML: .5; 3 SOLUTION RESPIRATORY (INHALATION) at 23:32

## 2017-08-19 RX ADMIN — IPRATROPIUM BROMIDE AND ALBUTEROL SULFATE 3 ML: .5; 3 SOLUTION RESPIRATORY (INHALATION) at 13:00

## 2017-08-19 RX ADMIN — HYDROCORTISONE: 25 CREAM TOPICAL at 10:29

## 2017-08-19 RX ADMIN — HYDROCORTISONE ACETATE 25 MG: 25 SUPPOSITORY RECTAL at 17:24

## 2017-08-19 RX ADMIN — HYDROCORTISONE: 25 CREAM TOPICAL at 17:24

## 2017-08-19 RX ADMIN — SODIUM CHLORIDE 75 ML/HR: 9 INJECTION, SOLUTION INTRAVENOUS at 06:08

## 2017-08-19 RX ADMIN — CALCIUM ACETATE MONOHYDRATE AND ALUMINUM SULFATE TETRADECAHYDRATE 1 PACKET: 952; 1347 POWDER, FOR SOLUTION TOPICAL at 21:41

## 2017-08-19 RX ADMIN — SODIUM CHLORIDE 75 ML/HR: 9 INJECTION, SOLUTION INTRAVENOUS at 13:18

## 2017-08-19 RX ADMIN — POLYETHYLENE GLYCOL 3350 17 G: 17 POWDER, FOR SOLUTION ORAL at 10:28

## 2017-08-19 RX ADMIN — ESCITALOPRAM 10 MG: 10 TABLET, FILM COATED ORAL at 10:28

## 2017-08-19 RX ADMIN — SODIUM CHLORIDE 500 ML: 9 INJECTION, SOLUTION INTRAVENOUS at 11:14

## 2017-08-19 RX ADMIN — CALCIUM ACETATE MONOHYDRATE AND ALUMINUM SULFATE TETRADECAHYDRATE 1 PACKET: 952; 1347 POWDER, FOR SOLUTION TOPICAL at 17:23

## 2017-08-19 RX ADMIN — HYDROCORTISONE ACETATE 25 MG: 25 SUPPOSITORY RECTAL at 10:29

## 2017-08-19 NOTE — PLAN OF CARE
Problem: Patient Care Overview (Adult)  Goal: Plan of Care Review  Outcome: Ongoing (interventions implemented as appropriate)    08/19/17 0512   Coping/Psychosocial Response Interventions   Plan Of Care Reviewed With patient   Patient Care Overview   Progress no change   Outcome Evaluation   Outcome Summary/Follow up Plan Pt with stable v/s, monitor HBG and other labs. Q2H turn. Skin care and moisture altagracia applied. Nutrition to evaluate for tube feeds.          Problem: Gastrointestinal Bleeding (Adult)  Goal: Signs and Symptoms of Listed Potential Problems Will be Absent or Manageable (Gastrointestinal Bleeding)  Outcome: Ongoing (interventions implemented as appropriate)    Problem: Fall Risk (Adult)  Goal: Identify Related Risk Factors and Signs and Symptoms  Outcome: Ongoing (interventions implemented as appropriate)    Problem: Nutrition, Imbalanced: Inadequate Oral Intake (Adult)  Goal: Identify Related Risk Factors and Signs and Symptoms  Outcome: Ongoing (interventions implemented as appropriate)    Problem: Skin Integrity Impairment, Risk/Actual (Adult)  Goal: Identify Related Risk Factors and Signs and Symptoms  Outcome: Ongoing (interventions implemented as appropriate)

## 2017-08-19 NOTE — PLAN OF CARE
Problem: Patient Care Overview (Adult)  Goal: Plan of Care Review  Outcome: Ongoing (interventions implemented as appropriate)    08/1940   Coping/Psychosocial Response Interventions   Plan Of Care Reviewed With patient   Patient Care Overview   Progress no change   Outcome Evaluation   Outcome Summary/Follow up Plan Pt started on tube feeds via PEG tube. Barrier cream & hydrocortisone cream applied. Intermittent Yonker suctioning.       Goal: Adult Individualization and Mutuality  Outcome: Ongoing (interventions implemented as appropriate)  Goal: Discharge Needs Assessment  Outcome: Ongoing (interventions implemented as appropriate)    Problem: Gastrointestinal Bleeding (Adult)  Goal: Signs and Symptoms of Listed Potential Problems Will be Absent or Manageable (Gastrointestinal Bleeding)  Outcome: Ongoing (interventions implemented as appropriate)    Problem: Fluid Volume Deficit (Adult)  Goal: Fluid/Electrolyte Balance  Outcome: Ongoing (interventions implemented as appropriate)  Goal: Comfort/Well Being  Outcome: Ongoing (interventions implemented as appropriate)    Problem: Fall Risk (Adult)  Goal: Identify Related Risk Factors and Signs and Symptoms  Outcome: Ongoing (interventions implemented as appropriate)  Goal: Absence of Falls  Outcome: Ongoing (interventions implemented as appropriate)    Problem: Nutrition, Imbalanced: Inadequate Oral Intake (Adult)  Goal: Identify Related Risk Factors and Signs and Symptoms  Outcome: Ongoing (interventions implemented as appropriate)  Goal: Improved Oral Intake  Outcome: Ongoing (interventions implemented as appropriate)  Goal: Prevent Further Weight Loss  Outcome: Ongoing (interventions implemented as appropriate)    Problem: Skin Integrity Impairment, Risk/Actual (Adult)  Goal: Identify Related Risk Factors and Signs and Symptoms  Outcome: Ongoing (interventions implemented as appropriate)  Goal: Skin Integrity/Wound Healing  Outcome: Ongoing (interventions  implemented as appropriate)    Problem: Nutrition, Enteral (Adult)  Goal: Signs and Symptoms of Listed Potential Problems Will be Absent or Manageable (Nutrition, Enteral)  Outcome: Ongoing (interventions implemented as appropriate)

## 2017-08-19 NOTE — PROGRESS NOTES
[facility]  Inpatient Progress Note    Reason for Follow Up:  Bright red blood per rectum, rectal pain, anemia, constipation    Subjective     Interval History:   No complaints this morning.  No rectal bleeding.    Current Facility-Administered Medications:   •  aluminum sulfate-calcium acetate (DOMEBORO) packet 1 packet, 1 packet, Topical, Q8H, Lb Galloway MD, 1 packet at 08/19/17 0606  •  escitalopram (LEXAPRO) tablet 10 mg, 10 mg, Oral, Daily, Elijah Aguirre MD, 10 mg at 08/19/17 1028  •  hydrocortisone (ANUSOL-HC) 2.5 % rectal cream, , Rectal, BID, Aneesh POLLOCK MD  •  hydrocortisone (ANUSOL-HC) suppository 25 mg, 25 mg, Rectal, BID, Aneesh POLLOCK MD, 25 mg at 08/19/17 1029  •  ipratropium-albuterol (DUO-NEB) nebulizer solution 3 mL, 3 mL, Nebulization, Q4H PRN, Elijah Aguirre MD  •  ipratropium-albuterol (DUO-NEB) nebulizer solution 3 mL, 3 mL, Nebulization, Q6H, Santos Bunch MD, 3 mL at 08/19/17 1300  •  ondansetron (ZOFRAN) injection 4 mg, 4 mg, Intravenous, Q6H PRN, Elijah Aguirre MD, 4 mg at 08/18/17 0520  •  polyethylene glycol (MIRALAX) powder 17 g, 17 g, Oral, Daily, Myesha Ulloa MD, 17 g at 08/19/17 1028  •  sodium chloride 0.9 % flush 1-10 mL, 1-10 mL, Intravenous, PRN, Elijah Agiurre MD  •  sodium chloride 0.9 % infusion, 75 mL/hr, Intravenous, Continuous, Elijah Aguirre MD, Last Rate: 75 mL/hr at 08/19/17 1318, 75 mL/hr at 08/19/17 1318  •  terazosin (HYTRIN) capsule 1 mg, 1 mg, Per G Tube, Q12H, Elijah Aguirre MD, 1 mg at 08/18/17 2049  Review of Systems:    He endorses weakness and fatigue all other systems reviewed and are negative    Objective     Vital Signs  Temp:  [96 °F (35.6 °C)-97.3 °F (36.3 °C)] 96 °F (35.6 °C)  Heart Rate:  [56-78] 56  Resp:  [12-20] 20  BP: ()/(42-66) 86/59  Body mass index is 16.42 kg/(m^2).    Intake/Output Summary (Last 24 hours) at 08/19/17 7065  Last data filed  at 08/19/17 1114   Gross per 24 hour   Intake             3587 ml   Output              425 ml   Net             3162 ml     I/O this shift:  In: 525 [Other:25; IV Piggyback:500]  Out: -      Physical Exam:   General: patient awake, alert and cooperative   Eyes: Normal lids and lashes, no scleral icterus   Neck: supple, normal ROM   Skin: warm and dry, not jaundiced   Cardiovascular: regular rhythm and rate, no murmurs auscultated   Pulm: clear to auscultation bilaterally, regular and unlabored   Abdomen: soft, nontender, nondistended; normal bowel sounds   Rectal: deferred   Extremities: no rash or edema   Psychiatric: Normal mood and behavior; memory intact     Results Review:     I reviewed the patient's new clinical results.      Results from last 7 days  Lab Units 08/19/17  0753 08/19/17  0309 08/18/17  2334  08/18/17  0602  08/17/17 1911   WBC 10*3/mm3  --  2.78*  --   --  4.49*  --  4.04*   HEMOGLOBIN g/dL 9.2* 9.0* 9.4*  < > 10.9*  < > 11.4*   HEMATOCRIT % 28.9* 28.5* 29.2*  < > 34.0*  < > 36.6*   PLATELETS 10*3/mm3  --  136*  --   --  162  --  157   < > = values in this interval not displayed.    Results from last 7 days  Lab Units 08/19/17  0309 08/17/17 1911 08/17/17  0354   SODIUM mmol/L 144 143 140  141   POTASSIUM mmol/L 4.0 4.6 4.5  4.4   CHLORIDE mmol/L 106 104 101  102   CO2 mmol/L 25.4 28.4 28.6  29.8*   BUN mg/dL 19 28* 22  23   CREATININE mg/dL 0.39* 0.54* 0.54*  0.54*   CALCIUM mg/dL 7.7* 8.7 8.6  8.7   BILIRUBIN mg/dL  --  0.7 0.7   ALK PHOS U/L  --  141* 143*   ALT (SGPT) U/L  --  110* 116*   AST (SGOT) U/L  --  77* 78*   GLUCOSE mg/dL 50* 73 98  98       Results from last 7 days  Lab Units 08/17/17 1911 08/17/17  0354   INR  1.18* 1.10     No results found for: LIPASE    Radiology:  XR Chest 1 View   Final Result          Assessment/Plan     Patient Active Problem List   Diagnosis   • Acute respiratory failure with hypoxia and hypercapnia   • Acute respiratory failure with hypoxia    • Lower GI bleed   • Chronic respiratory failure with hypoxia and hypercapnia   • Bilateral deafness   • COPD (chronic obstructive pulmonary disease)   • Dysphagia   • S/P percutaneous endoscopic gastrostomy (PEG) tube placement   • Malnutrition     Plan:    No further GI bleeding, follow hemoglobin.  It has been stable  MiraLAX to prevent constipation    We will see as needed    I discussed the patients findings and my recommendations with patient and nursing staff.    Nigel Valente MD

## 2017-08-19 NOTE — PROGRESS NOTES
"DAILY PROGRESS NOTE  [facility]    Patient Identification:  Name: Francisco Arana Sr.  Age: 76 y.o.  Sex: male  :  1940  MRN: [id number]         Primary Care Physician: Kevin Villatoro MD      Subjective  No c/o.      Objective:  General Appearance:  Comfortable, well-appearing, in no acute distress and not in pain (Thin, frail. ).    Vital signs: (most recent): Blood pressure (!) 89/50, pulse 76, temperature 97.6 °F (36.4 °C), temperature source Oral, resp. rate 20, height 65\" (165.1 cm), weight 98 lb 11.2 oz (44.8 kg), SpO2 100 %.    Lungs:  Normal respiratory rate and normal effort.  Breath sounds clear to auscultation.    Heart: Normal rate.  Regular rhythm.    Extremities: There is no dependent edema.    Neurological: Patient is alert.  (Cooperative and pleasant. ).    Skin:  Warm and dry.                Vital signs in last 24 hours:  Temp:  [96 °F (35.6 °C)-97.6 °F (36.4 °C)] 97.6 °F (36.4 °C)  Heart Rate:  [56-76] 76  Resp:  [16-20] 20  BP: ()/(42-66) 89/50    Intake/Output:    Intake/Output Summary (Last 24 hours) at 17 190  Last data filed at 17 1800   Gross per 24 hour   Intake             1767 ml   Output              575 ml   Net             1192 ml           Results from last 7 days  Lab Units 17  1600 17  0753 17  0309 17  2334 17  1805 17  0602 17  2351 17  1911  17  0354   WBC 10*3/mm3  --   --  2.78*  --   --  4.49*  --  4.04*  --  4.66   HEMOGLOBIN g/dL 9.1* 9.2* 9.0* 9.4* 9.6* 10.9* 10.9* 11.4*  < > 12.1*   PLATELETS 10*3/mm3  --   --  136*  --   --  162  --  157  --  149   < > = values in this interval not displayed.  Results from last 7 days  Lab Units 17  0309 17  1911 17  0354   SODIUM mmol/L 144 143 140  141   POTASSIUM mmol/L 4.0 4.6 4.5  4.4   CHLORIDE mmol/L 106 104 101  102   CO2 mmol/L 25.4 28.4 28.6  29.8*   BUN mg/dL 19 28* 22  23   CREATININE mg/dL 0.39* 0.54* 0.54* "  0.54*   GLUCOSE mg/dL 50* 73 98  98   Estimated Creatinine Clearance: 48.2 mL/min (by C-G formula based on Cr of 0.39).  Results from last 7 days  Lab Units 08/19/17  0309 08/17/17 1911 08/17/17  0354   CALCIUM mg/dL 7.7* 8.7 8.6  8.7   ALBUMIN g/dL  --  3.20* 3.20*     Results from last 7 days  Lab Units 08/17/17 1911 08/17/17  0354   ALBUMIN g/dL 3.20* 3.20*   BILIRUBIN mg/dL 0.7 0.7   ALK PHOS U/L 141* 143*   AST (SGOT) U/L 77* 78*   ALT (SGPT) U/L 110* 116*       Assessment:  Principal Problem:    Lower GI bleed - hemorrhoids.  Stable.   Active Problems:    Chronic respiratory failure with hypoxia and hypercapnia    Bilateral deafness    COPD (chronic obstructive pulmonary disease)    Dysphagia    S/P percutaneous endoscopic gastrostomy (PEG) tube placement    Malnutrition    Hypotension post endoscope:  Gently hydrate and monitor over nigh. Hold Hytrin.         Plan:  Please see above.     Lb Galloway MD  8/19/2017  7:09 PM

## 2017-08-19 NOTE — CONSULTS
Adult Nutrition  Assessment/PES    Patient Name:  Francisco Arana Sr.  YOB: 1940  MRN: [id number]  Admit Date:  8/17/2017    Assessment Date:  8/19/2017     Comments:  TF to begin via PEG.  Goal is Jevity 1.5 at 55cc/hr with 25cc water q hour.  Orders entered.  Will follow for tolerance as TF rate is advanced.           Reason for Assessment       08/19/17 0955    Reason for Assessment    Reason For Assessment/Visit follow up protocol;TF/PN                Anthropometrics       08/19/17 0955    Anthropometrics    RD Documented Current Weight  44.5 kg (98 lb)            Labs/Tests/Procedures/Meds       08/19/17 0955    Labs/Tests/Procedures/Meds    Diagnostic Test/Procedure Review reviewed    Labs/Tests Review Reviewed    Medication Review Reviewed, pertinent    Significant Vitals reviewed            Physical Findings       08/19/17 0956    Physical Appearance    Gastrointestinal feeding tube    Tubes peg tube              Nutrition Prescription Ordered       08/19/17 0956    Nutrition Prescription PO    Current PO Diet NPO                Problem/Interventions:          Problem 2       08/19/17 0958    Nutrition Diagnoses Problem 2    Problem 2 Needs Alternate    Etiology (related to) Functional Diagnosis    Functional Diagnosis Dysphagia    Signs/Symptoms (evidenced by) EN Intake Delivery;NPO    Percent (%) of EN goal 100 %                  Intervention Goal       08/19/17 0958    Intervention Goal    General Nutrition support treatment    TF/PN Inititiate TF/PN;Tolerate TF at goal;Deliver (%) goal    Deliver % of Goal 100 %    Weight Appropriate weight gain            Nutrition Intervention       08/19/17 0959    Nutrition Intervention    RD/Tech Action Follow Tx progress;Care plan reviewd;Recommend/ordered    Recommended/Ordered EN            Nutrition Prescription       08/19/17 0959    Nutrition Prescription EN    Enteral Prescription Enteral begin/change;Enteral to supply    Enteral Route PEG     Product Jevity 1.5 yvrose    TF Delivery Method Continuous    Continuous TF Goal Rate (mL/hr) 55 mL/hr    Continuous TF Starting Rate (mL/hr) 25 mL/hr    Water flush (mL)  25 mL    Water Flush Frequency Per hour    New EN Prescription Ordered? Yes    EN to Supply    Kcal/Day 1980 Kcal/Day    Kcal/Kg 44 Kcal/Kg    Protein (gm/day) 84 gm/day    Meet Estimated Kcal Need (%) 100 %    Meet Estimated Protein Need (%) 100 %    TF Free H2O (mL) 1003 mL    Total Free H2O (mL/day) 1603 mL/day            Education/Evaluation       08/19/17 1000    Monitor/Evaluation    Monitor TF delivery/tolerance;Weight;Skin status;Per protocol;I&O            Electronically signed by:  Radha Angel RD  08/19/17 10:02 AM

## 2017-08-19 NOTE — PLAN OF CARE
Problem: Patient Care Overview (Adult)  Goal: Plan of Care Review  Outcome: Ongoing (interventions implemented as appropriate)    08/18/17 1959   Coping/Psychosocial Response Interventions   Plan Of Care Reviewed With patient   Patient Care Overview   Progress no change   Outcome Evaluation   Outcome Summary/Follow up Plan Pt. had colonoscopy today for GI bleeding w/diverticulosis, engorged hemorrhoids, and fecal impaction noted. Fecal impaction removed. Hemoglobin continues to trend down. Seen by dietary and tube feeds to begin. Peg tube placed on 08/17/17. [place] area extremely excoriated treated with [place] soaks and hydrocortisone cream & suppository. Pt. on 2-3 liters of O2,. Vitals otherwise stable. Will continue to monitor         Problem: Fluid Volume Deficit (Adult)  Goal: Fluid/Electrolyte Balance  Outcome: Ongoing (interventions implemented as appropriate)  Goal: Comfort/Well Being  Outcome: Ongoing (interventions implemented as appropriate)    Problem: Fall Risk (Adult)  Goal: Absence of Falls  Outcome: Ongoing (interventions implemented as appropriate)    Problem: Nutrition, Imbalanced: Inadequate Oral Intake (Adult)  Goal: Improved Oral Intake  Outcome: Ongoing (interventions implemented as appropriate)  Goal: Prevent Further Weight Loss  Outcome: Ongoing (interventions implemented as appropriate)

## 2017-08-20 LAB
BASOPHILS # BLD AUTO: 0.01 10*3/MM3 (ref 0–0.2)
BASOPHILS NFR BLD AUTO: 0.2 % (ref 0–1.5)
DEPRECATED RDW RBC AUTO: 50.4 FL (ref 37–54)
EOSINOPHIL # BLD AUTO: 0.02 10*3/MM3 (ref 0–0.7)
EOSINOPHIL NFR BLD AUTO: 0.4 % (ref 0.3–6.2)
ERYTHROCYTE [DISTWIDTH] IN BLOOD BY AUTOMATED COUNT: 12.9 % (ref 11.5–14.5)
GLUCOSE BLDC GLUCOMTR-MCNC: 107 MG/DL (ref 70–130)
GLUCOSE BLDC GLUCOMTR-MCNC: 116 MG/DL (ref 70–130)
HCT VFR BLD AUTO: 30.2 % (ref 40.4–52.2)
HCT VFR BLD AUTO: 32.1 % (ref 40.4–52.2)
HGB BLD-MCNC: 10.4 G/DL (ref 13.7–17.6)
HGB BLD-MCNC: 9.7 G/DL (ref 13.7–17.6)
IMM GRANULOCYTES # BLD: 0.05 10*3/MM3 (ref 0–0.03)
IMM GRANULOCYTES NFR BLD: 1.1 % (ref 0–0.5)
LYMPHOCYTES # BLD AUTO: 0.63 10*3/MM3 (ref 0.9–4.8)
LYMPHOCYTES NFR BLD AUTO: 13.4 % (ref 19.6–45.3)
MCH RBC QN AUTO: 34.8 PG (ref 27–32.7)
MCHC RBC AUTO-ENTMCNC: 32.4 G/DL (ref 32.6–36.4)
MCV RBC AUTO: 107.4 FL (ref 79.8–96.2)
MONOCYTES # BLD AUTO: 0.4 10*3/MM3 (ref 0.2–1.2)
MONOCYTES NFR BLD AUTO: 8.5 % (ref 5–12)
NEUTROPHILS # BLD AUTO: 3.6 10*3/MM3 (ref 1.9–8.1)
NEUTROPHILS NFR BLD AUTO: 76.4 % (ref 42.7–76)
PLATELET # BLD AUTO: 158 10*3/MM3 (ref 140–500)
PMV BLD AUTO: 9.6 FL (ref 6–12)
RBC # BLD AUTO: 2.99 10*6/MM3 (ref 4.6–6)
WBC NRBC COR # BLD: 4.71 10*3/MM3 (ref 4.5–10.7)

## 2017-08-20 PROCEDURE — 82962 GLUCOSE BLOOD TEST: CPT

## 2017-08-20 PROCEDURE — G0378 HOSPITAL OBSERVATION PER HR: HCPCS

## 2017-08-20 PROCEDURE — 94799 UNLISTED PULMONARY SVC/PX: CPT

## 2017-08-20 PROCEDURE — 85025 COMPLETE CBC W/AUTO DIFF WBC: CPT | Performed by: INTERNAL MEDICINE

## 2017-08-20 RX ADMIN — CALCIUM ACETATE MONOHYDRATE AND ALUMINUM SULFATE TETRADECAHYDRATE 1 PACKET: 952; 1347 POWDER, FOR SOLUTION TOPICAL at 21:49

## 2017-08-20 RX ADMIN — HYDROCORTISONE: 25 CREAM TOPICAL at 17:32

## 2017-08-20 RX ADMIN — CALCIUM ACETATE MONOHYDRATE AND ALUMINUM SULFATE TETRADECAHYDRATE 1 PACKET: 952; 1347 POWDER, FOR SOLUTION TOPICAL at 14:52

## 2017-08-20 RX ADMIN — HYDROCORTISONE ACETATE 25 MG: 25 SUPPOSITORY RECTAL at 17:32

## 2017-08-20 RX ADMIN — POLYETHYLENE GLYCOL 3350 17 G: 17 POWDER, FOR SOLUTION ORAL at 09:25

## 2017-08-20 RX ADMIN — HYDROCORTISONE ACETATE 25 MG: 25 SUPPOSITORY RECTAL at 09:26

## 2017-08-20 RX ADMIN — IPRATROPIUM BROMIDE AND ALBUTEROL SULFATE 3 ML: .5; 3 SOLUTION RESPIRATORY (INHALATION) at 07:10

## 2017-08-20 RX ADMIN — IPRATROPIUM BROMIDE AND ALBUTEROL SULFATE 3 ML: .5; 3 SOLUTION RESPIRATORY (INHALATION) at 11:31

## 2017-08-20 RX ADMIN — SODIUM CHLORIDE 75 ML/HR: 9 INJECTION, SOLUTION INTRAVENOUS at 02:41

## 2017-08-20 RX ADMIN — IPRATROPIUM BROMIDE AND ALBUTEROL SULFATE 3 ML: .5; 3 SOLUTION RESPIRATORY (INHALATION) at 19:23

## 2017-08-20 RX ADMIN — HYDROCORTISONE: 25 CREAM TOPICAL at 09:25

## 2017-08-20 RX ADMIN — ESCITALOPRAM 10 MG: 10 TABLET, FILM COATED ORAL at 09:25

## 2017-08-20 RX ADMIN — CALCIUM ACETATE MONOHYDRATE AND ALUMINUM SULFATE TETRADECAHYDRATE 1 PACKET: 952; 1347 POWDER, FOR SOLUTION TOPICAL at 06:00

## 2017-08-20 NOTE — PROGRESS NOTES
"DAILY PROGRESS NOTE  [facility]    Patient Identification:  Name: Francisco Arana Sr.  Age: 76 y.o.  Sex: male  :  1940  MRN: [id number]         Primary Care Physician: Kevin Villatoro MD      Subjective  No c/o.  Communicated w/ pad/pen.    Objective:  General Appearance:  Comfortable, well-appearing, in no acute distress and not in pain (Thin, frail. ).    Vital signs: (most recent): Blood pressure 104/60, pulse 69, temperature 95.6 °F (35.3 °C), temperature source Oral, resp. rate 20, height 65\" (165.1 cm), weight 98 lb 11.2 oz (44.8 kg), SpO2 96 %.    Lungs:  Normal respiratory rate and normal effort.  Breath sounds clear to auscultation.    Heart: Normal rate.  Regular rhythm.    Extremities: There is no dependent edema.    Neurological: Patient is alert.  (Cooperative and pleasant. ).    Skin:  Warm and dry.                Vital signs in last 24 hours:  Temp:  [95.6 °F (35.3 °C)-98.1 °F (36.7 °C)] 95.6 °F (35.3 °C)  Heart Rate:  [57-76] 69  Resp:  [16-20] 20  BP: ()/(51-71) 104/60    Intake/Output:    Intake/Output Summary (Last 24 hours) at 17 1427  Last data filed at 17 1410   Gross per 24 hour   Intake             2400 ml   Output              450 ml   Net             1950 ml           Results from last 7 days  Lab Units 17  0601 17  2353 17  1600 17  0753 17  0309 17  2334 17  1805 17  0602  17  1911  17  0354   WBC 10*3/mm3 4.71  --   --   --  2.78*  --   --  4.49*  --  4.04*  --  4.66   HEMOGLOBIN g/dL 10.4* 9.7* 9.1* 9.2* 9.0* 9.4* 9.6* 10.9*  < > 11.4*  < > 12.1*   PLATELETS 10*3/mm3 158  --   --   --  136*  --   --  162  --  157  --  149   < > = values in this interval not displayed.    Results from last 7 days  Lab Units 17  0309 17  1911 17  0354   SODIUM mmol/L 144 143 140  141   POTASSIUM mmol/L 4.0 4.6 4.5  4.4   CHLORIDE mmol/L 106 104 101  102   CO2 mmol/L 25.4 28.4 28.6  " 29.8*   BUN mg/dL 19 28* 22  23   CREATININE mg/dL 0.39* 0.54* 0.54*  0.54*   GLUCOSE mg/dL 50* 73 98  98   Estimated Creatinine Clearance: 48.2 mL/min (by C-G formula based on Cr of 0.39).    Results from last 7 days  Lab Units 08/19/17  0309 08/17/17 1911 08/17/17  0354   CALCIUM mg/dL 7.7* 8.7 8.6  8.7   ALBUMIN g/dL  --  3.20* 3.20*       Results from last 7 days  Lab Units 08/17/17 1911 08/17/17  0354   ALBUMIN g/dL 3.20* 3.20*   BILIRUBIN mg/dL 0.7 0.7   ALK PHOS U/L 141* 143*   AST (SGOT) U/L 77* 78*   ALT (SGPT) U/L 110* 116*       Assessment:  Principal Problem:    Lower GI bleed - hemorrhoids.  Stable.   Active Problems:    Chronic respiratory failure with hypoxia and hypercapnia    Bilateral deafness    COPD (chronic obstructive pulmonary disease)    Dysphagia    S/P percutaneous endoscopic gastrostomy (PEG) tube placement    Malnutrition    Hypotension post endoscope:  Gently hydrate and monitor over nigh. Hold Hytrin.         Plan:  Please see above.   Awaiting precert for [place].      Paco Quinn MD  8/20/2017  2:27 PM

## 2017-08-20 NOTE — PLAN OF CARE
Problem: Patient Care Overview (Adult)  Goal: Plan of Care Review  Outcome: Ongoing (interventions implemented as appropriate)    08/20/17 1400 08/20/17 1707   Coping/Psychosocial Response Interventions   Plan Of Care Reviewed With patient --    Patient Care Overview   Progress --  improving   Outcome Evaluation   Outcome Summary/Follow up Plan --  Patient's BP has improved, is now stable. Continued feedings per PEG tube. Barrier creams applied, hydrocolloid dressing changed. Little evidence of healing r/t coccyx pressure wound. Intermittent [place] suctioning.       Goal: Adult Individualization and Mutuality  Outcome: Ongoing (interventions implemented as appropriate)  Goal: Discharge Needs Assessment    08/17/17 0500 08/18/17 0320 08/20/17 1707   Discharge Needs Assessment   Concerns To Be Addressed --  --  basic needs concerns   Concerns Comments --  --  Concern for poor-healing pressure wounds & malnutrition   Readmission Within The Last 30 Days current reason for admission unrelated to previous admission --  --    Community Agency Name(S) --  [place] --    Equipment Needed After Discharge --  --  nutrition supplies;wound care supplies   Discharge Facility/Level Of Care Needs --  --  nursing facility, skilled   Current Discharge Risk --  --  chronically ill;dependent with mobility/activities of daily living   Discharge Disposition --  --  still a patient   Current Health   Outpatient/Agency/Support Group Needs skilled nursing facility (specify) --  --    Anticipated Changes Related to Illness --  --  inability to care for self   Living Environment   Transportation Available --  --  car;family or friend will provide   Self-Care   Equipment Currently Used at Home --  --  (Long-term care facility accomodations)         Problem: Gastrointestinal Bleeding (Adult)  Goal: Signs and Symptoms of Listed Potential Problems Will be Absent or Manageable (Gastrointestinal Bleeding)  Outcome: Ongoing  (interventions implemented as appropriate)    Problem: Fluid Volume Deficit (Adult)  Goal: Fluid/Electrolyte Balance  Outcome: Ongoing (interventions implemented as appropriate)  Goal: Comfort/Well Being  Outcome: Ongoing (interventions implemented as appropriate)    Problem: Fall Risk (Adult)  Goal: Identify Related Risk Factors and Signs and Symptoms  Outcome: Ongoing (interventions implemented as appropriate)  Goal: Absence of Falls  Outcome: Ongoing (interventions implemented as appropriate)    Problem: Nutrition, Imbalanced: Inadequate Oral Intake (Adult)  Goal: Identify Related Risk Factors and Signs and Symptoms  Outcome: Ongoing (interventions implemented as appropriate)  Goal: Improved Oral Intake  Outcome: Ongoing (interventions implemented as appropriate)  Goal: Prevent Further Weight Loss  Outcome: Ongoing (interventions implemented as appropriate)    Problem: Skin Integrity Impairment, Risk/Actual (Adult)  Goal: Identify Related Risk Factors and Signs and Symptoms  Outcome: Ongoing (interventions implemented as appropriate)  Goal: Skin Integrity/Wound Healing  Outcome: Ongoing (interventions implemented as appropriate)    Problem: Nutrition, Enteral (Adult)  Goal: Signs and Symptoms of Listed Potential Problems Will be Absent or Manageable (Nutrition, Enteral)  Outcome: Ongoing (interventions implemented as appropriate)

## 2017-08-21 LAB
GLUCOSE BLDC GLUCOMTR-MCNC: 117 MG/DL (ref 70–130)
GLUCOSE BLDC GLUCOMTR-MCNC: 128 MG/DL (ref 70–130)
GLUCOSE BLDC GLUCOMTR-MCNC: 136 MG/DL (ref 70–130)

## 2017-08-21 PROCEDURE — 94799 UNLISTED PULMONARY SVC/PX: CPT

## 2017-08-21 PROCEDURE — G8978 MOBILITY CURRENT STATUS: HCPCS

## 2017-08-21 PROCEDURE — G0378 HOSPITAL OBSERVATION PER HR: HCPCS

## 2017-08-21 PROCEDURE — 97110 THERAPEUTIC EXERCISES: CPT

## 2017-08-21 PROCEDURE — 82962 GLUCOSE BLOOD TEST: CPT

## 2017-08-21 PROCEDURE — G8979 MOBILITY GOAL STATUS: HCPCS

## 2017-08-21 PROCEDURE — 97162 PT EVAL MOD COMPLEX 30 MIN: CPT

## 2017-08-21 RX ADMIN — CALCIUM ACETATE MONOHYDRATE AND ALUMINUM SULFATE TETRADECAHYDRATE 1 PACKET: 952; 1347 POWDER, FOR SOLUTION TOPICAL at 06:12

## 2017-08-21 RX ADMIN — IPRATROPIUM BROMIDE AND ALBUTEROL SULFATE 3 ML: .5; 3 SOLUTION RESPIRATORY (INHALATION) at 11:46

## 2017-08-21 RX ADMIN — IPRATROPIUM BROMIDE AND ALBUTEROL SULFATE 3 ML: .5; 3 SOLUTION RESPIRATORY (INHALATION) at 07:45

## 2017-08-21 RX ADMIN — HYDROCORTISONE ACETATE 25 MG: 25 SUPPOSITORY RECTAL at 09:17

## 2017-08-21 RX ADMIN — HYDROCORTISONE ACETATE 25 MG: 25 SUPPOSITORY RECTAL at 18:00

## 2017-08-21 RX ADMIN — CALCIUM ACETATE MONOHYDRATE AND ALUMINUM SULFATE TETRADECAHYDRATE 1 PACKET: 952; 1347 POWDER, FOR SOLUTION TOPICAL at 14:03

## 2017-08-21 RX ADMIN — IPRATROPIUM BROMIDE AND ALBUTEROL SULFATE 3 ML: .5; 3 SOLUTION RESPIRATORY (INHALATION) at 00:11

## 2017-08-21 RX ADMIN — IPRATROPIUM BROMIDE AND ALBUTEROL SULFATE 3 ML: .5; 3 SOLUTION RESPIRATORY (INHALATION) at 23:45

## 2017-08-21 RX ADMIN — HYDROCORTISONE: 25 CREAM TOPICAL at 18:00

## 2017-08-21 RX ADMIN — HYDROCORTISONE: 25 CREAM TOPICAL at 09:18

## 2017-08-21 RX ADMIN — IPRATROPIUM BROMIDE AND ALBUTEROL SULFATE 3 ML: .5; 3 SOLUTION RESPIRATORY (INHALATION) at 19:35

## 2017-08-21 RX ADMIN — CALCIUM ACETATE MONOHYDRATE AND ALUMINUM SULFATE TETRADECAHYDRATE 1 PACKET: 952; 1347 POWDER, FOR SOLUTION TOPICAL at 22:28

## 2017-08-21 RX ADMIN — ESCITALOPRAM 10 MG: 10 TABLET, FILM COATED ORAL at 09:17

## 2017-08-21 RX ADMIN — POLYETHYLENE GLYCOL 3350 17 G: 17 POWDER, FOR SOLUTION ORAL at 09:17

## 2017-08-21 NOTE — PLAN OF CARE
Problem: Patient Care Overview (Adult)  Goal: Plan of Care Review    08/21/17 1455   Coping/Psychosocial Response Interventions   Plan Of Care Reviewed With patient   Patient Care Overview   Progress improving   Outcome Evaluation   Outcome Summary/Follow up Plan pt pleasant and cooperative. expressed that he is eager to go back home. has inquired when he will be discharged several times throughout shift. pt primarily communicates by reading lips and has a legal pad at bedside to write notes. denies any pain, soa or needs at this time. peg tube patent, jevity 1.5 yvrose infusing at 55ml/hr. pt tolerating well- hob elevated to 45 degrees. will continue to monitor.         Problem: Fluid Volume Deficit (Adult)  Goal: Fluid/Electrolyte Balance  Outcome: Ongoing (interventions implemented as appropriate)    Problem: Fall Risk (Adult)  Goal: Absence of Falls  Outcome: Ongoing (interventions implemented as appropriate)    Problem: Nutrition, Imbalanced: Inadequate Oral Intake (Adult)  Goal: Prevent Further Weight Loss  Outcome: Ongoing (interventions implemented as appropriate)    08/21/17 1455   Nutrition, Imbalanced: Inadequate Oral Intake (Adult)   Prevent Further Weight Loss making progress toward outcome         Problem: COPD, Chronic Bronchitis/Emphysema (Adult)  Intervention: Optimize Hydration/Nutrition    08/21/17 1455   Nutrition Interventions   Oral Nutrition Promotion rest periods promoted;other (see comments)  (pt on tube feed)           Problem: Skin Integrity Impairment, Risk/Actual (Adult)  Goal: Skin Integrity/Wound Healing  Outcome: Ongoing (interventions implemented as appropriate)    08/21/17 1455   Skin Integrity Impairment, Risk/Actual (Adult)   Skin Integrity/Wound Healing making progress toward outcome         Problem: Nutrition, Enteral (Adult)  Intervention: Monitor/Manage Nutrition Support    08/21/17 1455   Coping/Psychosocial Interventions   Environmental Support calm environment  promoted;environmental consistency promoted;rest periods encouraged         Goal: Signs and Symptoms of Listed Potential Problems Will be Absent or Manageable (Nutrition, Enteral)  Outcome: Ongoing (interventions implemented as appropriate)    08/21/17 4375   Nutrition, Enteral   Problems Assessed (Enteral Nutrition) malnutrition;feeding tube occlusion;aspiration;nausea and vomiting;fluid imbalance   Problems Present (Enteral Nutrition) fluid imbalance;malnutrition

## 2017-08-21 NOTE — PROGRESS NOTES
Continued Stay Note  [facility]     Patient Name: Francisco Arana Sr.  MRN: [id number]  Today's Date: 8/21/2017    Admit Date: 8/17/2017          Discharge Plan       08/21/17 0949    Case Management/Social Work Plan    Plan [facility] skilled-  [employer] precert to start this am.     Patient/Family In Agreement With Plan yes    Additional Comments Spoke with Judi at [facility] at [phone] and she states they will start [employer] precert this am and will notify CCP when precert obtained.  CCP will continue tofollow and assist as needed.....Blossom Pizarro RN,CCP               Discharge Codes     None            Blossom Pizarro RN

## 2017-08-21 NOTE — PROGRESS NOTES
"DAILY PROGRESS NOTE  [facility]    Patient Identification:  Name: Francisco Arana Sr.  Age: 76 y.o.  Sex: male  :  1940  MRN: [id number]         Primary Care Physician: Kevin Villatoro MD      Subjective  Resting.    Objective:  General Appearance:  Comfortable, well-appearing, in no acute distress and not in pain (Thin, frail. ).    Vital signs: (most recent): Blood pressure 107/74, pulse 84, temperature 98.2 °F (36.8 °C), temperature source Oral, resp. rate 22, height 65\" (165.1 cm), weight 101 lb 4.8 oz (45.9 kg), SpO2 97 %.    Lungs:  Normal respiratory rate and normal effort.  Breath sounds clear to auscultation.    Heart: Normal rate.  Regular rhythm.    Extremities: There is no dependent edema.    Neurological: Patient is alert.  (Cooperative and pleasant. ).    Skin:  Warm and dry.            Vital signs in last 24 hours:  Temp:  [95.6 °F (35.3 °C)-98.5 °F (36.9 °C)] 98.2 °F (36.8 °C)  Heart Rate:  [66-84] 84  Resp:  [16-22] 22  BP: (104-135)/(60-88) 107/74    Intake/Output:    Intake/Output Summary (Last 24 hours) at 17 1154  Last data filed at 17 0830   Gross per 24 hour   Intake             1957 ml   Output              350 ml   Net             1607 ml           Results from last 7 days  Lab Units 17  0601 17  2353 17  1600 17  0753 17  0309 17  2334 17  1805 17  0602  17  1911  17  0354   WBC 10*3/mm3 4.71  --   --   --  2.78*  --   --  4.49*  --  4.04*  --  4.66   HEMOGLOBIN g/dL 10.4* 9.7* 9.1* 9.2* 9.0* 9.4* 9.6* 10.9*  < > 11.4*  < > 12.1*   PLATELETS 10*3/mm3 158  --   --   --  136*  --   --  162  --  157  --  149   < > = values in this interval not displayed.    Results from last 7 days  Lab Units 17  0309 17  1911 17  0354   SODIUM mmol/L 144 143 140  141   POTASSIUM mmol/L 4.0 4.6 4.5  4.4   CHLORIDE mmol/L 106 104 101  102   CO2 mmol/L 25.4 28.4 28.6  29.8*   BUN mg/dL 19 28* 22 "  23   CREATININE mg/dL 0.39* 0.54* 0.54*  0.54*   GLUCOSE mg/dL 50* 73 98  98   Estimated Creatinine Clearance: 49.4 mL/min (by C-G formula based on Cr of 0.39).    Results from last 7 days  Lab Units 08/19/17  0309 08/17/17 1911 08/17/17  0354   CALCIUM mg/dL 7.7* 8.7 8.6  8.7   ALBUMIN g/dL  --  3.20* 3.20*       Results from last 7 days  Lab Units 08/17/17 1911 08/17/17  0354   ALBUMIN g/dL 3.20* 3.20*   BILIRUBIN mg/dL 0.7 0.7   ALK PHOS U/L 141* 143*   AST (SGOT) U/L 77* 78*   ALT (SGPT) U/L 110* 116*       Assessment:  Principal Problem:    Lower GI bleed - hemorrhoids.  Stable.   Active Problems:    Chronic respiratory failure with hypoxia and hypercapnia    Bilateral deafness    COPD (chronic obstructive pulmonary disease)    Dysphagia    S/P percutaneous endoscopic gastrostomy (PEG) tube placement    Malnutrition    Hypotension post endoscope:  Gently hydrate and monitor over nigh. Hold Hytrin.         Plan:  Please see above.   Still awaiting precert for [place].      Paco Quinn MD  8/21/2017  11:54 AM

## 2017-08-21 NOTE — DISCHARGE SUMMARY
NAME: Francisco Arana . ADMIT: 2017   : 1940  PCP: Kevin Villatoro MD    MRN: [id number] LOS: 4 days   AGE/SEX: 77 y.o. male  ROOM: [facility]     Date of Admission:  2017  Date of Discharge:  2017    PCP: Kevin Villatoro MD      CHIEF COMPLAINT  No chief complaint on file.      DISCHARGE DIAGNOSIS  Active Hospital Problems (** Indicates Principal Problem)    Diagnosis Date Noted   • **Lower GI bleed [K92.2] 2017   • Malnutrition [E46] 2017   • Chronic respiratory failure with hypoxia and hypercapnia [J96.11, J96.12] 2017   • Bilateral deafness [H91.93] 2017   • COPD (chronic obstructive pulmonary disease) [J44.9] 2017   • Dysphagia [R13.10] 2017   • S/P percutaneous endoscopic gastrostomy (PEG) tube placement [Z93.1] 2017      Resolved Hospital Problems    Diagnosis Date Noted Date Resolved   No resolved problems to display.       SECONDARY DIAGNOSES  Past Medical History:   Diagnosis Date   • AAA (abdominal aortic aneurysm)    • Asthma    • COPD (chronic obstructive pulmonary disease)    • Deaf    • Dysphagia    • Emphysema lung    • History of herniorrhaphy    • Hypotension    • Neuralgia    • Pneumonia    • Pneumothorax    • Skin cancer     skin   • Trigeminal neuralgia        CONSULTS   Consult Orders (all)     Start     Ordered    17 0337  Inpatient Consult to Gastroenterology  Once     Specialty:  Gastroenterology  Provider:  Xiomara Hatch MD    17 0337            PROCEDURES PERFORMED  COLONOSCOPY   2017  Impression:  - Preparation of the colon was poor.  - Stool in the rectum.  - Diverticulosis in the sigmoid colon.  - Bleeding internal hemorrhoids.  - The examination was otherwise normal on direct and retroflexion views.  - No specimens collected.  Recommendations:  - Miralax 1 capful (17 grams) in 8 ounces of water PO daily.  - Anusol (pramoxine) HC suppository: Insert rectally BID.        HOSPITAL COURSE  Mr. Arana is  a 76 y.o. male who presented to [facility] initially complaining of blood in his stools.  Please see the admitting history and physical for further details.  He was admitted to a monitored unit for telemetry monitoring, serial hemoglobin monitoring and monitoring of possible recurrent bleeding.  He did continue to have some bloody stools and developed acute blood loss anemia.  He was seen by gastroenterology who recommended endoscopic evaluation.  He underwent colonoscopy on 8/18 with results outlined above.  It was felt the source of his bleeding was hemorrhoidal and he was started on Anusol suppositories as well as MiraLAX to prevent constipation.  Following the procedure he did develop mild hypotension which was treated with IV fluids.  His hemoglobin has remained stable and he appears stable for transfer back to the [facility].          PHYSICAL EXAM  Temp:  [96.2 °F (35.7 °C)-97.5 °F (36.4 °C)] 96.8 °F (36 °C)  Heart Rate:  [62-78] 78  Resp:  [18-20] 18  BP: (113-126)/(60-76) 113/71  Body mass index is 17.46 kg/(m^2).  Physical Exam  Lungs:  Normal respiratory rate and normal effort.  Breath sounds clear to auscultation.    Heart: Normal rate.  Regular rhythm.    Extremities: There is no dependent edema.    Neurological: Patient is alert.  (Cooperative and pleasant. ).    Skin:  Warm and dry.        CONDITION ON DISCHARGE  Stable.      DISCHARGE DISPOSITION   Skilled Nursing Facility (DC - External)  [facility]      DISCHARGE MEDICATIONS   Francisco Arana Sr.   Home Medication Instructions EVELYN:[id number]    Printed on:08/22/17 1688   Medication Information                      escitalopram (LEXAPRO) 10 MG tablet  Take 10 mg by mouth Daily.             guaiFENesin (ROBITUSSIN) 100 MG/5ML syrup  Take 20 mg by mouth 2 (Two) Times a Day As Needed for Cough.             hydrocortisone (ANUSOL-HC) 2.5 % rectal cream  Insert  into the rectum 2 (Two) Times a Day.             hydrocortisone  (ANUSOL-HC) 25 MG suppository  Insert 1 suppository into the rectum 2 (Two) Times a Day.             ipratropium-albuterol (DUO-NEB) 0.5-2.5 mg/mL nebulizer  Take 3 mL by nebulization Every 4 (Four) Hours As Needed for Wheezing or Shortness of Air.             ipratropium-albuterol (DUO-NEB) 0.5-2.5 mg/mL nebulizer  Take 3 mL by nebulization 4 (Four) Times a Day.             NON FORMULARY  Apply 1 application topically 2 (Two) Times a Day. Remedy Phytoplex Z-guard 575 topical pasta - apply thin layer to wound on coccyx and red area until healed             Nutritional Supplements (JEVITY 1.5 SUKHJINDER) liquid  55 mL by Per G Tube route Continuous. 55 cc/hr             polyethylene glycol (MIRALAX) packet  Take 17 g by mouth Daily.             terazosin (HYTRIN) 1 MG capsule  1 capsule by Per G Tube route Every 12 (Twelve) Hours.               Diet Instructions     Diet: Tube Feeding; Continuous; Jevity 1.5 (High Sukhjinder High Pro With Fiber)       Discharge Diet:  Tube Feeding   Feeding Type:  Continuous   Formula & Rate:  Jevity 1.5 (High Sukhjinder High Pro With Fiber)   Jevity 1.5 (High Sukhjinder High Pro With Fiber); Tube Feeding Type: Continuous; Continuous Tube Feeding Start Rate (mL/hr): 25; Then Advance Rate By (mL/hr): 20; Every __ Hours: 8; To Goal Rate of (mL/hr): 55; NPO Except Meds / NPO Dietary Only                Activity Instructions     Activity as Tolerated                 No future appointments.  Follow-up Information     Follow up with Kevin Villatoro MD Follow up in 2 day(s).    Specialty:  Internal Medicine    Contact information:    [address] [place] [id number] [phone]               Paco Quinn MD  [facility]  08/22/17  1:51 PM

## 2017-08-21 NOTE — PLAN OF CARE
Problem: Patient Care Overview (Adult)  Goal: Plan of Care Review  Outcome: Ongoing (interventions implemented as appropriate)    08/21/17 0318   Coping/Psychosocial Response Interventions   Plan Of Care Reviewed With patient   Patient Care Overview   Progress improving   Outcome Evaluation   Outcome Summary/Follow up Plan Anticipating discharge today. Continuing TF with no signs of intolerance. Repositioning every 2 hours and providing incontinence care as needed. Answering call light in person and allowing extra time for communication. Wound care as needed. Will continue to monitor.       Goal: Adult Individualization and Mutuality  Outcome: Ongoing (interventions implemented as appropriate)  Goal: Discharge Needs Assessment  Outcome: Ongoing (interventions implemented as appropriate)    Problem: Gastrointestinal Bleeding (Adult)  Goal: Signs and Symptoms of Listed Potential Problems Will be Absent or Manageable (Gastrointestinal Bleeding)  Outcome: Ongoing (interventions implemented as appropriate)    Problem: Fluid Volume Deficit (Adult)  Goal: Fluid/Electrolyte Balance  Outcome: Ongoing (interventions implemented as appropriate)  Goal: Comfort/Well Being  Outcome: Ongoing (interventions implemented as appropriate)    Problem: Fall Risk (Adult)  Goal: Identify Related Risk Factors and Signs and Symptoms  Outcome: Outcome(s) achieved Date Met:  08/21/17  Goal: Absence of Falls  Outcome: Ongoing (interventions implemented as appropriate)    Problem: Nutrition, Imbalanced: Inadequate Oral Intake (Adult)  Goal: Identify Related Risk Factors and Signs and Symptoms  Outcome: Outcome(s) achieved Date Met:  08/21/17  Goal: Improved Oral Intake  Outcome: Ongoing (interventions implemented as appropriate)  Goal: Prevent Further Weight Loss  Outcome: Ongoing (interventions implemented as appropriate)    Problem: Skin Integrity Impairment, Risk/Actual (Adult)  Goal: Identify Related Risk Factors and Signs and  Symptoms  Outcome: Outcome(s) achieved Date Met:  08/21/17  Goal: Skin Integrity/Wound Healing  Outcome: Ongoing (interventions implemented as appropriate)    Problem: Nutrition, Enteral (Adult)  Goal: Signs and Symptoms of Listed Potential Problems Will be Absent or Manageable (Nutrition, Enteral)  Outcome: Ongoing (interventions implemented as appropriate)

## 2017-08-21 NOTE — THERAPY EVALUATION
Acute Care - Physical Therapy Initial Evaluation  [facility]     Patient Name: Francisco Arana Sr.  : 1940  MRN: [id number]  Today's Date: 2017   Onset of Illness/Injury or Date of Surgery Date: 17  Date of Referral to PT: 17  Referring Physician: Kai      Admit Date: 2017     Visit Dx:    ICD-10-CM ICD-9-CM   1. Lower GI bleed K92.2 578.9   2. Difficulty walking R26.2 719.7     Patient Active Problem List   Diagnosis   • Acute respiratory failure with hypoxia and hypercapnia   • Acute respiratory failure with hypoxia   • Lower GI bleed   • Chronic respiratory failure with hypoxia and hypercapnia   • Bilateral deafness   • COPD (chronic obstructive pulmonary disease)   • Dysphagia   • S/P percutaneous endoscopic gastrostomy (PEG) tube placement   • Malnutrition     Past Medical History:   Diagnosis Date   • AAA (abdominal aortic aneurysm)    • Asthma    • COPD (chronic obstructive pulmonary disease)    • Deaf    • Dysphagia    • Emphysema lung    • History of herniorrhaphy    • Hypotension    • Neuralgia    • Pneumonia    • Pneumothorax    • Skin cancer     skin   • Trigeminal neuralgia      Past Surgical History:   Procedure Laterality Date   • CHEST TUBE INSERTION     • COLONOSCOPY N/A 2017    Procedure: COLONOSCOPY TO CECUM;  Surgeon: Myesha Ulloa MD;  Location: [facility] ENDOSCOPY;  Service:    • ENDOSCOPY WITH GASTROSTOMY TUBE INSERTION N/A 7/10/2016    Procedure: ESOPHAGOGASTRODUODENOSCOPY WITH GASTROSTOMY TUBE INSERTION;  Surgeon: Nigel Valente MD;  Location: [facility] ENDOSCOPY;  Service:    • HERNIA REPAIR            PT ASSESSMENT (last 72 hours)      PT Evaluation       17 0935 17 1707    Rehab Evaluation    Document Type evaluation  -EJ     Subjective Information agree to therapy;complains of;weakness;fatigue;dyspnea  -EJ     Patient Effort, Rehab Treatment adequate  -EJ     Symptoms Noted During/After Treatment shortness of breath  -EJ     General  Information    Onset of Illness/Injury or Date of Surgery Date 08/17/17  -EJ     Referring Physician Ray  -EJ     General Observations supine in bed, no acute distress, pt is deaf  -EJ     Precautions/Limitations fall precautions;oxygen therapy device and L/min   DEAF  -EJ     Prior Level of Function min assist:;transfer;ADL's;gait  -EJ     Equipment Currently Used at Home  --   Long-term care facility accomodations  -AU    Plans/Goals Discussed With patient;agreed upon  -EJ     Barriers to Rehab hearing deficit  -EJ     Living Environment    Lives With facility resident  -EJ     Living Arrangements extended care facility  -EJ     Transportation Available  car;family or friend will provide  -AU    Clinical Impression    Date of Referral to PT 08/21/17  -EJ     Patient/Family Goals Statement Pt plans to return to [facility]  -EJ     Criteria for Skilled Therapeutic Interventions Met treatment indicated  -EJ     Impairments Found (describe specific impairments) gait, locomotion, and balance  -EJ     Rehab Potential fair, will monitor progress closely  -EJ     Vital Signs    Pre SpO2 (%) 91  -EJ     O2 Delivery Pre Treatment supplemental O2  -EJ     O2 Delivery Intra Treatment supplemental O2  -EJ     Post SpO2 (%) 83  -EJ     O2 Delivery Post Treatment supplemental O2  -EJ     Pain Assessment    Pain Assessment No/denies pain  -EJ     Cognitive Assessment/Intervention    Current Cognitive/Communication Assessment functional  -EJ     Orientation Status oriented to;person;situation;unable/difficult to assess  -EJ     Follows Commands/Answers Questions 75% of the time;needs increased time;needs cueing;able to follow single-step instructions;other (see comments)   speak slowly, pt is deaf  -EJ     Personal Safety mild impairment  -EJ     Personal Safety Interventions fall prevention program maintained;nonskid shoes/slippers when out of bed;supervised activity  -EJ     ROM (Range of Motion)    General ROM no range of  motion deficits identified  -EJ     MMT (Manual Muscle Testing)    General MMT Assessment no strength deficits identified;other (see comments)   generalized weakness  -EJ     Bed Mobility, Assessment/Treatment    Bed Mob, Supine to Sit, [place] supervision required  -EJ     Bed Mob, Sit to Supine, [place] not tested  -EJ     Transfer Assessment/Treatment    Transfers, Sit-Stand [place] verbal cues required;stand by assist  -EJ     Transfers, Stand-Sit [place] stand by assist  -EJ     Gait Assessment/Treatment    Gait, [place] Level verbal cues required;contact guard assist;stand by assist  -EJ     Gait, Distance (Feet) 10  -EJ     Gait, Gait Deviations shruti decreased;step length decreased  -EJ     Gait, Comment limited 2' SOA, O2 drops to low 80's with minimal activity  -EJ     Positioning and Restraints    Pre-Treatment Position in bed  -EJ     Post Treatment Position chair  -EJ     In Chair notified nsg;reclined;call light within reach;encouraged to call for assist;exit alarm on  -       08/18/17 5566       Living Environment    Transportation Available car  -       User Key  (r) = Recorded By, (t) = Taken By, (c) = Cosigned By    Initials Name Provider Type    RP Manda Vela, RN Registered Nurse     Christine Ramsey, LAUREN Physical Therapist    AU Janel Gutierrez RN Registered Nurse          Physical Therapy Education     Title: PT OT SLP Therapies (Done)     Topic: Physical Therapy (Done)     Point: Mobility training (Done)    Learning Progress Summary    Learner Readiness Method Response Comment Documented by Status   Patient Acceptance PATT PERRY NR   08/21/17 1019 Done               Point: Home exercise program (Done)    Learning Progress Summary    Learner Readiness Method Response Comment Documented by Status   Patient Acceptance PATT PERRY NR   08/21/17 1019 Done               Point: Body mechanics (Done)    Learning Progress Summary    Learner Readiness Method Response  Comment Documented by Status   Patient Acceptance PATT PERRY,NR  EJ 08/21/17 1019 Done               Point: Precautions (Done)    Learning Progress Summary    Learner Readiness Method Response Comment Documented by Status   Patient Acceptance PATT PERRY,NR  EJ 08/21/17 1019 Done                      User Key     Initials Effective Dates Name Provider Type Discipline     04/21/17 -  Christine Ramsey, PT Physical Therapist PT                PT Recommendation and Plan  Anticipated Discharge Disposition: skilled nursing facility  Planned Therapy Interventions: bed mobility training, gait training, patient/family education, strengthening, transfer training  PT Frequency: daily  Plan of Care Review  Plan Of Care Reviewed With: patient  Progress: improving  Outcome Summary/Follow up Plan: Pt agreeable to PT, plans to return to [facility]. Pt primarily limited by SOA today, even with just tranferring from bed to chair. If pt does remain in hospital, he will benefit from skilled PT to strengthening and increased activity tolerance.,          IP PT Goals       08/21/17 1020          Bed Mobility PT LTG    Bed Mobility PT LTG, Date Established 08/21/17  -EJ      Bed Mobility PT LTG, Time to Achieve 5 days  -EJ      Bed Mobility PT LTG, Activity Type all bed mobility  -EJ      Bed Mobility PT LTG, [place] Level independent  -EJ      Transfer Training PT LTG    Transfer Training PT LTG, Date Established 08/21/17  -EJ      Transfer Training PT LTG, Time to Achieve 5 days  -EJ      Transfer Training PT LTG, Activity Type bed to chair /chair to bed;sit to stand/stand to sit  -EJ      Transfer Training PT LTG, [place] Level supervision required  -EJ      Transfer Training PT LTG, Assist Device walker, rolling  -EJ      Gait Training PT LTG    Gait Training Goal PT LTG, Date Established 08/21/17  -EJ      Gait Training Goal PT LTG, Time to Achieve 5 days  -EJ      Gait Training Goal PT LTG, [place] Level contact guard  assist  -EJ      Gait Training Goal PT LTG, Assist Device walker, rolling  -EJ      Gait Training Goal PT LTG, Distance to Achieve 50  -EJ        User Key  (r) = Recorded By, (t) = Taken By, (c) = Cosigned By    Initials Name Provider Type    TESSY Ramsey PT Physical Therapist                Outcome Measures       08/21/17 1000          How much help from another person do you currently need...    Turning from your back to your side while in flat bed without using bedrails? 4  -EJ      Moving from lying on back to sitting on the side of a flat bed without bedrails? 3  -EJ      Moving to and from a bed to a chair (including a wheelchair)? 3  -EJ      Standing up from a chair using your arms (e.g., wheelchair, bedside chair)? 3  -EJ      Climbing 3-5 steps with a railing? 2  -EJ      To walk in hospital room? 3  -EJ      AM-PAC 6 Clicks Score 18  -EJ      Functional Assessment    Outcome Measure Options AM-PAC 6 Clicks Basic Mobility (PT)  -EJ        User Key  (r) = Recorded By, (t) = Taken By, (c) = Cosigned By    Initials Name Provider Type    TESSY Ramsey PT Physical Therapist           Time Calculation:         PT Charges       08/21/17 1022          Time Calculation    Start Time 0935  -EJ      Stop Time 1022  -EJ      Time Calculation (min) 47 min  -EJ      PT Received On 08/21/17  -EJ      PT - Next Appointment 08/22/17  -EJ      PT Goal Re-Cert Due Date 08/26/17  -EJ        User Key  (r) = Recorded By, (t) = Taken By, (c) = Cosigned By    Initials Name Provider Type    TESSY Ramsey PT Physical Therapist          Therapy Charges for Today     Code Description Service Date Service Provider Modifiers Qty    93976773781 HC PT EVAL MOD COMPLEXITY 2 8/21/2017 Christine Ramsey, PT GP 1    55649560225 HC PT THER PROC EA 15 MIN 8/21/2017 Christine Ramsey, PT GP 1    63396456208 HC PT THER SUPP EA 15 MIN 8/21/2017 Christine Ramsey, PT GP 1          PT G-Codes  Outcome Measure Options:  AM-PAC 6 Clicks Basic Mobility (PT)      Christine Ramsey, PT  8/21/2017

## 2017-08-21 NOTE — PLAN OF CARE
Problem: Patient Care Overview (Adult)  Goal: Plan of Care Review  Outcome: Ongoing (interventions implemented as appropriate)    08/21/17 1020   Coping/Psychosocial Response Interventions   Plan Of Care Reviewed With patient   Patient Care Overview   Progress improving   Outcome Evaluation   Outcome Summary/Follow up Plan Pt agreeable to PT, plans to return to [facility]. Pt primarily limited by SOA today, even with just tranferring from bed to chair. If pt does remain in hospital, he will benefit from skilled PT to strengthening and increased activity tolerance.,         Problem: Inpatient Physical Therapy  Goal: Bed Mobility Goal LTG- PT  Outcome: Ongoing (interventions implemented as appropriate)    08/21/17 1020   Bed Mobility PT LTG   Bed Mobility PT LTG, Date Established 08/21/17   Bed Mobility PT LTG, Time to Achieve 5 days   Bed Mobility PT LTG, Activity Type all bed mobility   Bed Mobility PT LTG, [place] Level independent       Goal: Transfer Training Goal 1 LTG- PT  Outcome: Ongoing (interventions implemented as appropriate)    08/21/17 1020   Transfer Training PT LTG   Transfer Training PT LTG, Date Established 08/21/17   Transfer Training PT LTG, Time to Achieve 5 days   Transfer Training PT LTG, Activity Type bed to chair /chair to bed;sit to stand/stand to sit   Transfer Training PT LTG, [place] Level supervision required   Transfer Training PT LTG, Assist Device walker, rolling       Goal: Gait Training Goal LTG- PT  Outcome: Ongoing (interventions implemented as appropriate)    08/21/17 1020   Gait Training PT LTG   Gait Training Goal PT LTG, Date Established 08/21/17   Gait Training Goal PT LTG, Time to Achieve 5 days   Gait Training Goal PT LTG, [place] Level contact guard assist   Gait Training Goal PT LTG, Assist Device walker, rolling   Gait Training Goal PT LTG, Distance to Achieve 50

## 2017-08-22 VITALS
BODY MASS INDEX: 17.48 KG/M2 | HEART RATE: 78 BPM | SYSTOLIC BLOOD PRESSURE: 113 MMHG | DIASTOLIC BLOOD PRESSURE: 71 MMHG | TEMPERATURE: 96.8 F | WEIGHT: 104.9 LBS | HEIGHT: 65 IN | RESPIRATION RATE: 18 BRPM | OXYGEN SATURATION: 98 %

## 2017-08-22 LAB
GLUCOSE BLDC GLUCOMTR-MCNC: 124 MG/DL (ref 70–130)
GLUCOSE BLDC GLUCOMTR-MCNC: 127 MG/DL (ref 70–130)

## 2017-08-22 PROCEDURE — 82962 GLUCOSE BLOOD TEST: CPT

## 2017-08-22 PROCEDURE — G0378 HOSPITAL OBSERVATION PER HR: HCPCS

## 2017-08-22 PROCEDURE — 94799 UNLISTED PULMONARY SVC/PX: CPT

## 2017-08-22 PROCEDURE — 97110 THERAPEUTIC EXERCISES: CPT

## 2017-08-22 RX ORDER — POLYETHYLENE GLYCOL 3350 17 G/17G
17 POWDER, FOR SOLUTION ORAL DAILY
Start: 2017-08-22

## 2017-08-22 RX ORDER — HYDROCORTISONE ACETATE 25 MG/1
25 SUPPOSITORY RECTAL 2 TIMES DAILY
Refills: 0
Start: 2017-08-22

## 2017-08-22 RX ADMIN — IPRATROPIUM BROMIDE AND ALBUTEROL SULFATE 3 ML: .5; 3 SOLUTION RESPIRATORY (INHALATION) at 06:48

## 2017-08-22 RX ADMIN — CALCIUM ACETATE MONOHYDRATE AND ALUMINUM SULFATE TETRADECAHYDRATE 1 PACKET: 952; 1347 POWDER, FOR SOLUTION TOPICAL at 14:55

## 2017-08-22 RX ADMIN — ESCITALOPRAM 10 MG: 10 TABLET, FILM COATED ORAL at 10:17

## 2017-08-22 RX ADMIN — IPRATROPIUM BROMIDE AND ALBUTEROL SULFATE 3 ML: .5; 3 SOLUTION RESPIRATORY (INHALATION) at 11:56

## 2017-08-22 RX ADMIN — HYDROCORTISONE: 25 CREAM TOPICAL at 10:19

## 2017-08-22 RX ADMIN — POLYETHYLENE GLYCOL 3350 17 G: 17 POWDER, FOR SOLUTION ORAL at 10:17

## 2017-08-22 NOTE — PLAN OF CARE
Problem: Patient Care Overview (Adult)  Goal: Plan of Care Review  Outcome: Ongoing (interventions implemented as appropriate)  Goal: Adult Individualization and Mutuality  Outcome: Ongoing (interventions implemented as appropriate)  Goal: Discharge Needs Assessment  Outcome: Ongoing (interventions implemented as appropriate)    Problem: Gastrointestinal Bleeding (Adult)  Goal: Signs and Symptoms of Listed Potential Problems Will be Absent or Manageable (Gastrointestinal Bleeding)  Outcome: Ongoing (interventions implemented as appropriate)    Problem: Fluid Volume Deficit (Adult)  Goal: Fluid/Electrolyte Balance  Outcome: Ongoing (interventions implemented as appropriate)  Goal: Comfort/Well Being  Outcome: Ongoing (interventions implemented as appropriate)    Problem: Fall Risk (Adult)  Goal: Absence of Falls  Outcome: Ongoing (interventions implemented as appropriate)    Problem: Nutrition, Imbalanced: Inadequate Oral Intake (Adult)  Goal: Prevent Further Weight Loss  Outcome: Outcome(s) achieved Date Met:  08/22/17    Problem: Skin Integrity Impairment, Risk/Actual (Adult)  Goal: Skin Integrity/Wound Healing  Outcome: Ongoing (interventions implemented as appropriate)    Problem: Nutrition, Enteral (Adult)  Goal: Signs and Symptoms of Listed Potential Problems Will be Absent or Manageable (Nutrition, Enteral)  Outcome: Ongoing (interventions implemented as appropriate)

## 2017-08-22 NOTE — PROGRESS NOTES
Continued Stay Note  [facility]     Patient Name: Francisco Arana Sr.  MRN: [id number]  Today's Date: 8/22/2017    Admit Date: 8/17/2017          Discharge Plan       08/22/17 1242    Case Management/Social Work Plan    Plan [facility] -skilled-  [employer] precert obtained today and bed available today.     Additional Comments Spoke with Judi at [facility] in [facility] office and she states [employer] precert has been obtained and bed is avaialable today.  Janis with [facility] informed at ext. [phone] that precert has been obtained for [facility] and she states she will inform Dr. Quinn......Blossom Pizarro RN,CCP               Discharge Codes     None            Blossom Pizarro RN

## 2017-08-22 NOTE — PLAN OF CARE
Problem: Patient Care Overview (Adult)  Goal: Plan of Care Review  Outcome: Ongoing (interventions implemented as appropriate)    08/22/17 1019   Coping/Psychosocial Response Interventions   Plan Of Care Reviewed With patient   Patient Care Overview   Progress progress toward functional goals as expected   Outcome Evaluation   Outcome Summary/Follow up Plan Pt is increasing with bed mobility and transfer safety to RWX as well as amb distance with RWX

## 2017-08-22 NOTE — PROGRESS NOTES
"DAILY PROGRESS NOTE  [facility]    Patient Identification:  Name: Francisco Arana Sr.  Age: 77 y.o.  Sex: male  :  1940  MRN: [id number]         Primary Care Physician: Kevin Villatoro MD      Subjective  Resting.  No new issues per RN.    Objective:  General Appearance:  Comfortable, well-appearing, in no acute distress and not in pain (Thin, frail. ).    Vital signs: (most recent): Blood pressure 116/76, pulse 68, temperature 96.2 °F (35.7 °C), temperature source Oral, resp. rate 18, height 65\" (165.1 cm), weight 104 lb 14.4 oz (47.6 kg), SpO2 98 %.    Lungs:  Normal respiratory rate and normal effort.  Breath sounds clear to auscultation.    Heart: Normal rate.  Regular rhythm.    Extremities: There is no dependent edema.    Neurological: Patient is alert.  (Cooperative and pleasant. ).    Skin:  Warm and dry.            Vital signs in last 24 hours:  Temp:  [96.2 °F (35.7 °C)-97.5 °F (36.4 °C)] 96.2 °F (35.7 °C)  Heart Rate:  [62-78] 68  Resp:  [18-20] 18  BP: (116-126)/(60-76) 116/76    Intake/Output:    Intake/Output Summary (Last 24 hours) at 17 1247  Last data filed at 17 1010   Gross per 24 hour   Intake              200 ml   Output             1750 ml   Net            -1550 ml           Results from last 7 days  Lab Units 17  0601 17  2353 17  1600 17  0753 17  0309 17  2334 17  1805 17  0602  17  1911  17  0354   WBC 10*3/mm3 4.71  --   --   --  2.78*  --   --  4.49*  --  4.04*  --  4.66   HEMOGLOBIN g/dL 10.4* 9.7* 9.1* 9.2* 9.0* 9.4* 9.6* 10.9*  < > 11.4*  < > 12.1*   PLATELETS 10*3/mm3 158  --   --   --  136*  --   --  162  --  157  --  149   < > = values in this interval not displayed.    Results from last 7 days  Lab Units 17  0309 17  1911 17  0354   SODIUM mmol/L 144 143 140  141   POTASSIUM mmol/L 4.0 4.6 4.5  4.4   CHLORIDE mmol/L 106 104 101  102   CO2 mmol/L 25.4 28.4 28.6  " 29.8*   BUN mg/dL 19 28* 22  23   CREATININE mg/dL 0.39* 0.54* 0.54*  0.54*   GLUCOSE mg/dL 50* 73 98  98   Estimated Creatinine Clearance: 50.4 mL/min (by C-G formula based on Cr of 0.39).    Results from last 7 days  Lab Units 08/19/17  0309 08/17/17 1911 08/17/17  0354   CALCIUM mg/dL 7.7* 8.7 8.6  8.7   ALBUMIN g/dL  --  3.20* 3.20*       Results from last 7 days  Lab Units 08/17/17 1911 08/17/17  0354   ALBUMIN g/dL 3.20* 3.20*   BILIRUBIN mg/dL 0.7 0.7   ALK PHOS U/L 141* 143*   AST (SGOT) U/L 77* 78*   ALT (SGPT) U/L 110* 116*       Assessment:  Principal Problem:    Lower GI bleed - hemorrhoids.  Stable.   Active Problems:    Chronic respiratory failure with hypoxia and hypercapnia    Bilateral deafness    COPD (chronic obstructive pulmonary disease)    Dysphagia    S/P percutaneous endoscopic gastrostomy (PEG) tube placement    Malnutrition    Hypotension post endoscope:  Gently hydrate and monitor over nigh. Hold Hytrin.         Plan:  Please see above.  Discussed w/ RN.  Still awaiting precert for [place].      Paco Quinn MD  8/22/2017  12:47 PM

## 2017-08-22 NOTE — PLAN OF CARE
Problem: Patient Care Overview (Adult)  Goal: Plan of Care Review  Outcome: Ongoing (interventions implemented as appropriate)    08/21/17 1455 08/22/17 0518   Coping/Psychosocial Response Interventions   Plan Of Care Reviewed With --  patient   Patient Care Overview   Progress improving --    Outcome Evaluation   Outcome Summary/Follow up Plan pt pleasant and cooperative. expressed that he is eager to go back home. has inquired when he will be discharged several times throughout shift. pt primarily communicates by reading lips and has a legal pad at bedside to write notes. denies any pain, soa or needs at this time. peg tube patent, jevity 1.5 yvrose infusing at 55ml/hr. pt tolerating well- hob elevated to 45 degrees. will continue to monitor. --        Goal: Adult Individualization and Mutuality  Outcome: Ongoing (interventions implemented as appropriate)  Goal: Discharge Needs Assessment  Outcome: Ongoing (interventions implemented as appropriate)    Problem: Gastrointestinal Bleeding (Adult)  Goal: Signs and Symptoms of Listed Potential Problems Will be Absent or Manageable (Gastrointestinal Bleeding)  Outcome: Ongoing (interventions implemented as appropriate)    Problem: Fluid Volume Deficit (Adult)  Goal: Fluid/Electrolyte Balance  Outcome: Ongoing (interventions implemented as appropriate)  Goal: Comfort/Well Being  Outcome: Ongoing (interventions implemented as appropriate)    Problem: Fall Risk (Adult)  Goal: Absence of Falls  Outcome: Ongoing (interventions implemented as appropriate)

## 2017-08-22 NOTE — PLAN OF CARE
Problem: Patient Care Overview (Adult)  Goal: Plan of Care Review  Outcome: Ongoing (interventions implemented as appropriate)  Goal: Adult Individualization and Mutuality  Outcome: Ongoing (interventions implemented as appropriate)  Goal: Discharge Needs Assessment  Outcome: Ongoing (interventions implemented as appropriate)    Problem: Gastrointestinal Bleeding (Adult)  Goal: Signs and Symptoms of Listed Potential Problems Will be Absent or Manageable (Gastrointestinal Bleeding)  Outcome: Ongoing (interventions implemented as appropriate)    Problem: Fluid Volume Deficit (Adult)  Goal: Fluid/Electrolyte Balance  Outcome: Ongoing (interventions implemented as appropriate)  Goal: Comfort/Well Being  Outcome: Ongoing (interventions implemented as appropriate)    Problem: Fall Risk (Adult)  Goal: Absence of Falls  Outcome: Ongoing (interventions implemented as appropriate)    Problem: Nutrition, Imbalanced: Inadequate Oral Intake (Adult)  Goal: Improved Oral Intake  Outcome: Ongoing (interventions implemented as appropriate)    Problem: Skin Integrity Impairment, Risk/Actual (Adult)  Goal: Skin Integrity/Wound Healing  Outcome: Ongoing (interventions implemented as appropriate)

## 2017-08-22 NOTE — DISCHARGE INSTRUCTIONS
[place] soaks to excoriation on coccyx and buttocks BID then cover with barrier cream     Duoderm to open area on coccyx Q3days

## 2017-08-22 NOTE — PROGRESS NOTES
Adult Nutrition  Assessment    Patient Name:  Francisco Arana Sr.  YOB: 1940  MRN: [id number]  Admit Date:  8/17/2017    Assessment Date:  8/22/2017          Reason for Assessment       08/22/17 1133    Reason for Assessment    Reason For Assessment/Visit follow up protocol;TF/PN                Anthropometrics       08/22/17 1133    Anthropometrics    RD Documented Current Weight  47.6 kg (104 lb 15 oz)            Labs/Tests/Procedures/Meds       08/22/17 1134    Labs/Tests/Procedures/Meds    Diagnostic Test/Procedure Review reviewed    Labs/Tests Review Reviewed    Medication Review Reviewed, pertinent    Significant Vitals reviewed            Physical Findings       08/22/17 1134    Physical Appearance    Gastrointestinal feeding tube    Tubes peg tube    Skin pressure ulcer(s)   coccyx PU, unstageable              Nutrition Prescription Ordered       08/22/17 1135    Nutrition Prescription PO    Current PO Diet NPO    Nutrition Prescription EN    Enteral Route PEG    Product Jevity 1.5 yvrose    TF Delivery Method Continuous    Continuous TF Goal Rate (mL/hr) 55 mL/hr    Continuous TF Current Rate (mL/hr) 55 mL/hr    Water flush (mL)  25 mL    Water Flush Frequency Per hour            Evaluation of Received Nutrient/Fluid Intake       08/22/17 1137    Evaluation of Received Nutrient/Fluid Intake    Nutrition Delivered Calorie Evaluation;Protein Evaluation    Calorie Intake Evaluation    Enteral Calories (kcal) 1980    Total Calories (kcal) 1980    Protein Intake Evaluation    Enteral Protein (gm) 84    Total Protein (gm) 84    Fluid Intake Evaluation    Enteral  Fluid (mL) 1003    Free Water Flush Fluid (mL) 1603    Total Fluid Intake (mL) 2606    EN Evaluation    HOB Greater than or equal to 30 degress            Electronically signed by:  Radha Angel RD  08/22/17 11:39 AM

## 2017-08-22 NOTE — THERAPY TREATMENT NOTE
Acute Care - Physical Therapy Treatment Note  [facility]     Patient Name: Francisco Arana Sr.  : 1940  MRN: [id number]  Today's Date: 2017  Onset of Illness/Injury or Date of Surgery Date: 17  Date of Referral to PT: 17  Referring Physician: Kai    Admit Date: 2017    Visit Dx:    ICD-10-CM ICD-9-CM   1. Lower GI bleed K92.2 578.9   2. Difficulty walking R26.2 719.7     Patient Active Problem List   Diagnosis   • Acute respiratory failure with hypoxia and hypercapnia   • Acute respiratory failure with hypoxia   • Lower GI bleed   • Chronic respiratory failure with hypoxia and hypercapnia   • Bilateral deafness   • COPD (chronic obstructive pulmonary disease)   • Dysphagia   • S/P percutaneous endoscopic gastrostomy (PEG) tube placement   • Malnutrition               Adult Rehabilitation Note       17 1000          Rehab Assessment/Intervention    Discipline physical therapy assistant  -CW      Document Type therapy note (daily note)  -CW      Subjective Information agree to therapy;complains of;fatigue;pain  -CW      Patient Effort, Rehab Treatment adequate  -CW      Symptoms Noted During/After Treatment shortness of breath;fatigue  -CW      Precautions/Limitations fall precautions;oxygen therapy device and L/min  -CW      Recorded by [CW] Asif Newman      Vital Signs    Pre SpO2 (%) 100  -CW      O2 Delivery Pre Treatment supplemental O2  -CW      O2 Delivery Intra Treatment supplemental O2  -CW      Post SpO2 (%) 82  -CW      O2 Delivery Post Treatment supplemental O2  -CW      Recorded by [CW] Asif Newman      Pain Assessment    Pain Assessment 0-10  -CW      Pain Score 7  -CW      Pain Location Generalized  -CW      Recorded by [CW] Asif Newman      Cognitive Assessment/Intervention    Current Cognitive/Communication Assessment functional  -CW      Orientation Status oriented to;person;situation  -CW      Follows Commands/Answers Questions 75% of the  time;able to follow single-step instructions;needs cueing  -CW      Personal Safety mild impairment;decreased awareness, need for safety;decreased insight to deficits  -CW      Personal Safety Interventions fall prevention program maintained;gait belt;muscle strengthening facilitated;nonskid shoes/slippers when out of bed  -CW      Recorded by [ANISHA] Asif Newman      Bed Mobility, Assessment/Treatment    Bed Mob, Supine to Sit, [place] supervision required  -CW      Bed Mob, Sit to Supine, [place] not tested  -CW      Bed Mobility, Comment up to chair  -CW      Recorded by [CW] Asif Newman      Transfer Assessment/Treatment    Transfers, Sit-Stand [place] verbal cues required;stand by assist  -CW      Transfers, Stand-Sit [place] stand by assist  -CW      Transfers, Sit-Stand-Sit, Assist Device rolling walker  -CW      Recorded by [CW] Asif Newman      Gait Assessment/Treatment    Gait, [place] Level verbal cues required;contact guard assist;stand by assist  -CW      Gait, Assistive Device rolling walker  -CW      Gait, Distance (Feet) 30  -CW      Gait, Gait Deviations shruti decreased;step length decreased;stride length decreased  -CW      Gait, Safety Issues supplemental O2  -CW      Recorded by [ANISHA] Asif Newman      Positioning and Restraints    Pre-Treatment Position in bed  -CW      Post Treatment Position chair  -CW      In Chair notified nsg;reclined;call light within reach;encouraged to call for assist;with nsg  -CW      Recorded by [ANISHA] Asif Newman        User Key  (r) = Recorded By, (t) = Taken By, (c) = Cosigned By    Initials Name Effective Dates     Asif Newman 12/13/16 -                 IP PT Goals       08/21/17 1020          Bed Mobility PT LTG    Bed Mobility PT LTG, Date Established 08/21/17  -EJ      Bed Mobility PT LTG, Time to Achieve 5 days  -EJ      Bed Mobility PT LTG, Activity Type all bed mobility  -EJ      Bed Mobility PT  LTG, [place] Level independent  -EJ      Transfer Training PT LTG    Transfer Training PT LTG, Date Established 08/21/17  -EJ      Transfer Training PT LTG, Time to Achieve 5 days  -EJ      Transfer Training PT LTG, Activity Type bed to chair /chair to bed;sit to stand/stand to sit  -EJ      Transfer Training PT LTG, [place] Level supervision required  -EJ      Transfer Training PT LTG, Assist Device walker, rolling  -EJ      Gait Training PT LTG    Gait Training Goal PT LTG, Date Established 08/21/17  -EJ      Gait Training Goal PT LTG, Time to Achieve 5 days  -EJ      Gait Training Goal PT LTG, [place] Level contact guard assist  -EJ      Gait Training Goal PT LTG, Assist Device walker, rolling  -EJ      Gait Training Goal PT LTG, Distance to Achieve 50  -EJ        User Key  (r) = Recorded By, (t) = Taken By, (c) = Cosigned By    Initials Name Provider Type    TESSY Ramsey, PT Physical Therapist          Physical Therapy Education     Title: PT OT SLP Therapies (Done)     Topic: Physical Therapy (Done)     Point: Mobility training (Done)    Learning Progress Summary    Learner Readiness Method Response Comment Documented by Status   Patient Acceptance VERONICA PERRY VU   08/22/17 1019 Done    Acceptance E,D VU,NR   08/21/17 1019 Done               Point: Home exercise program (Done)    Learning Progress Summary    Learner Readiness Method Response Comment Documented by Status   Patient Acceptance VERONICA PERRY VU   08/22/17 1019 Done    Acceptance E,D VU,NR   08/21/17 1019 Done               Point: Body mechanics (Done)    Learning Progress Summary    Learner Readiness Method Response Comment Documented by Status   Patient Acceptance VERONICA PERRY VU   08/22/17 1019 Done    Acceptance E,D VU,NR   08/21/17 1019 Done               Point: Precautions (Done)    Learning Progress Summary    Learner Readiness Method Response Comment Documented by Status   Patient Acceptance VERONICA PERRY VU   08/22/17 1019  Done    Acceptance E,D NEFTALI,ROEL   08/21/17 1019 Done                      User Key     Initials Effective Dates Name Provider Type Discipline     04/21/17 -  Christine Ramsey, PT Physical Therapist PT    CW 12/13/16 -  Asif Newman Physical Therapy Assistant PT                    PT Recommendation and Plan  Anticipated Discharge Disposition: skilled nursing facility  Planned Therapy Interventions: bed mobility training, gait training, patient/family education, strengthening, transfer training  PT Frequency: daily  Plan of Care Review  Plan Of Care Reviewed With: patient  Progress: progress toward functional goals as expected  Outcome Summary/Follow up Plan: Pt is increasing with bed mobility and transfer safety to RWX as well as amb distance with RWX          Outcome Measures       08/22/17 1000 08/21/17 1000       How much help from another person do you currently need...    Turning from your back to your side while in flat bed without using bedrails? 4  -CW 4  -EJ     Moving from lying on back to sitting on the side of a flat bed without bedrails? 4  -CW 3  -EJ     Moving to and from a bed to a chair (including a wheelchair)? 3  -CW 3  -EJ     Standing up from a chair using your arms (e.g., wheelchair, bedside chair)? 3  -CW 3  -EJ     Climbing 3-5 steps with a railing? 2  -CW 2  -EJ     To walk in hospital room? 3  -CW 3  -EJ     AM-PAC 6 Clicks Score 19  -CW 18  -EJ     Functional Assessment    Outcome Measure Options AM-PAC 6 Clicks Basic Mobility (PT)  -CW AM-PAC 6 Clicks Basic Mobility (PT)  -EJ       User Key  (r) = Recorded By, (t) = Taken By, (c) = Cosigned By    Initials Name Provider Type     Christine Ramsey, PT Physical Therapist    CW Asif Newman Physical Therapy Assistant           Time Calculation:         PT Charges       08/22/17 1020          Time Calculation    Start Time 1004  -CW      Stop Time 1020  -CW      Time Calculation (min) 16 min  -CW      PT Received On 08/22/17   -CW      PT - Next Appointment 08/23/17  -CW        User Key  (r) = Recorded By, (t) = Taken By, (c) = Cosigned By    Initials Name Provider Type    CW Asif Newman Physical Therapy Assistant          Therapy Charges for Today     Code Description Service Date Service Provider Modifiers Qty    [id number] HC PT THER PROC EA 15 MIN 8/22/2017 Asif Newman GP 1          PT G-Codes  Outcome Measure Options: AM-PAC 6 Clicks Basic Mobility (PT)    Asif Newman  8/22/2017

## 2017-08-22 NOTE — PROGRESS NOTES
Continued Stay Note  [facility]     Patient Name: Francisco Arana Sr.  MRN: [id number]  Today's Date: 8/22/2017    Admit Date: 8/17/2017          Discharge Plan       08/22/17 1418    Case Management/Social Work Plan    Plan [facility]) - skilled- family providing transportation with portable oxygen    Patient/Family In Agreement With Plan yes    Additional Comments Spoke with Roseann Arana, daughter-in-law, at [phone] to inform of patient's discharge today to [facility] in [place] and she states family will be providing transportation for patient to SNF and will bring his portable oxygen for transportation.  Informed Judi with [facility] at  [phone] of patient's discharge and that family will be providing transportation.   Informed ARACELI Durham that family will be providing transportation for patient to [facility] around 4-5pm today and will bring his portable oxygen for transportation.   Discharge summary faxed to [facility] with recieipt confirmation.....Blossom Pizarro RN,[facility]       08/22/17 1242    Case Management/Social Work Plan    Plan [facility]([place]) -skilled-  [employer] precert obtained today and bed available today.     Additional Comments Spoke with Judi at [facility] in [facility] office and she states [employer] precert has been obtained and bed is avaialable today.  Janis with [facility] informed at ext. [phone] that precert has been obtained for [facility] and she states she will inform Dr. Quinn......Blossom Pizarro RN,[facility]               Discharge Codes     None        Expected Discharge Date and Time     Expected Discharge Date Expected Discharge Time    Aug 22, 2017             Blossom Pizarro RN

## 2017-08-23 NOTE — PROGRESS NOTES
Continued Stay Note  [facility]     Patient Name: Francisco Arana Sr.  MRN: [id number]  Today's Date: 8/23/2017    Admit Date: 8/17/2017          Discharge Plan       08/23/17 0709    Case Management/Social Work Plan    Plan [facility]- skilled level    Final Note    Final Note Discharged  8/22/17 to [facility]- skilled level.               Discharge Codes       08/23/17 0708    Discharge Codes    Discharge Codes 03  Discharged/transferred to skilled nursing facility (SNF) with Medicare certification in anticipation of skilled care        Expected Discharge Date and Time     Expected Discharge Date Expected Discharge Time    Aug 22, 2017             Blossom Pizarro RN

## 2017-09-13 ENCOUNTER — HOSPITAL ENCOUNTER (INPATIENT)
Facility: HOSPITAL | Age: 77
LOS: 3 days | Discharge: HOME HEALTH | End: 2017-09-16
Attending: EMERGENCY MEDICINE | Admitting: INTERNAL MEDICINE

## 2017-09-13 ENCOUNTER — APPOINTMENT (OUTPATIENT)
Dept: GENERAL RADIOLOGY | Facility: HOSPITAL | Age: 77
End: 2017-09-13

## 2017-09-13 DIAGNOSIS — K92.2 LOWER GI BLEED: ICD-10-CM

## 2017-09-13 DIAGNOSIS — R79.89 ELEVATED LFTS: ICD-10-CM

## 2017-09-13 DIAGNOSIS — J96.21 ACUTE ON CHRONIC RESPIRATORY FAILURE WITH HYPOXIA (HCC): Primary | ICD-10-CM

## 2017-09-13 DIAGNOSIS — K75.9 HEPATITIS: ICD-10-CM

## 2017-09-13 DIAGNOSIS — R62.7 FAILURE TO THRIVE IN ADULT: ICD-10-CM

## 2017-09-13 DIAGNOSIS — J44.1 COPD WITH ACUTE EXACERBATION (HCC): ICD-10-CM

## 2017-09-13 PROBLEM — G93.41 ACUTE METABOLIC ENCEPHALOPATHY: Status: ACTIVE | Noted: 2017-09-13

## 2017-09-13 PROBLEM — E86.0 DEHYDRATION: Status: ACTIVE | Noted: 2017-09-13

## 2017-09-13 PROBLEM — J96.20 ACUTE ON CHRONIC RESPIRATORY FAILURE (HCC): Status: ACTIVE | Noted: 2017-09-13

## 2017-09-13 PROBLEM — E87.0 HYPERNATREMIA: Status: ACTIVE | Noted: 2017-09-13

## 2017-09-13 LAB
ALBUMIN SERPL-MCNC: 3.8 G/DL (ref 3.5–5.2)
ALBUMIN/GLOB SERPL: 1.1 G/DL
ALP SERPL-CCNC: 118 U/L (ref 39–117)
ALT SERPL W P-5'-P-CCNC: 203 U/L (ref 1–41)
ANION GAP SERPL CALCULATED.3IONS-SCNC: 8 MMOL/L
AST SERPL-CCNC: 160 U/L (ref 1–40)
BASOPHILS # BLD AUTO: 0.02 10*3/MM3 (ref 0–0.2)
BASOPHILS NFR BLD AUTO: 0.3 % (ref 0–1.5)
BILIRUB SERPL-MCNC: 0.7 MG/DL (ref 0.1–1.2)
BILIRUB UR QL STRIP: NEGATIVE
BUN BLD-MCNC: 29 MG/DL (ref 8–23)
BUN/CREAT SERPL: 46.8 (ref 7–25)
CALCIUM SPEC-SCNC: 9.8 MG/DL (ref 8.6–10.5)
CHLORIDE SERPL-SCNC: 102 MMOL/L (ref 98–107)
CK SERPL-CCNC: 36 U/L (ref 20–200)
CLARITY UR: CLEAR
CO2 SERPL-SCNC: 40 MMOL/L (ref 22–29)
COLOR UR: YELLOW
CREAT BLD-MCNC: 0.62 MG/DL (ref 0.76–1.27)
D-LACTATE SERPL-SCNC: 1.7 MMOL/L (ref 0.5–2)
DEPRECATED RDW RBC AUTO: 56.9 FL (ref 37–54)
EOSINOPHIL # BLD AUTO: 0.17 10*3/MM3 (ref 0–0.7)
EOSINOPHIL NFR BLD AUTO: 2.6 % (ref 0.3–6.2)
ERYTHROCYTE [DISTWIDTH] IN BLOOD BY AUTOMATED COUNT: 13.6 % (ref 11.5–14.5)
GFR SERPL CREATININE-BSD FRML MDRD: 125 ML/MIN/1.73
GLOBULIN UR ELPH-MCNC: 3.6 GM/DL
GLUCOSE BLD-MCNC: 107 MG/DL (ref 65–99)
GLUCOSE UR STRIP-MCNC: NEGATIVE MG/DL
HCT VFR BLD AUTO: 44.3 % (ref 40.4–52.2)
HGB BLD-MCNC: 13.5 G/DL (ref 13.7–17.6)
HGB UR QL STRIP.AUTO: NEGATIVE
IMM GRANULOCYTES # BLD: 0.02 10*3/MM3 (ref 0–0.03)
IMM GRANULOCYTES NFR BLD: 0.3 % (ref 0–0.5)
KETONES UR QL STRIP: NEGATIVE
LEUKOCYTE ESTERASE UR QL STRIP.AUTO: NEGATIVE
LYMPHOCYTES # BLD AUTO: 0.69 10*3/MM3 (ref 0.9–4.8)
LYMPHOCYTES NFR BLD AUTO: 10.4 % (ref 19.6–45.3)
MCH RBC QN AUTO: 35.1 PG (ref 27–32.7)
MCHC RBC AUTO-ENTMCNC: 30.5 G/DL (ref 32.6–36.4)
MCV RBC AUTO: 115.1 FL (ref 79.8–96.2)
MONOCYTES # BLD AUTO: 0.63 10*3/MM3 (ref 0.2–1.2)
MONOCYTES NFR BLD AUTO: 9.5 % (ref 5–12)
NEUTROPHILS # BLD AUTO: 5.08 10*3/MM3 (ref 1.9–8.1)
NEUTROPHILS NFR BLD AUTO: 76.9 % (ref 42.7–76)
NITRITE UR QL STRIP: NEGATIVE
NT-PROBNP SERPL-MCNC: 338.6 PG/ML (ref 0–1800)
PH UR STRIP.AUTO: 6.5 [PH] (ref 5–8)
PLATELET # BLD AUTO: 142 10*3/MM3 (ref 140–500)
PMV BLD AUTO: 10.8 FL (ref 6–12)
POTASSIUM BLD-SCNC: 4.5 MMOL/L (ref 3.5–5.2)
PROT SERPL-MCNC: 7.4 G/DL (ref 6–8.5)
PROT UR QL STRIP: ABNORMAL
RBC # BLD AUTO: 3.85 10*6/MM3 (ref 4.6–6)
SODIUM BLD-SCNC: 150 MMOL/L (ref 136–145)
SP GR UR STRIP: 1.01 (ref 1–1.03)
TROPONIN T SERPL-MCNC: 0.01 NG/ML (ref 0–0.03)
UROBILINOGEN UR QL STRIP: ABNORMAL
WBC NRBC COR # BLD: 6.61 10*3/MM3 (ref 4.5–10.7)

## 2017-09-13 PROCEDURE — 94799 UNLISTED PULMONARY SVC/PX: CPT

## 2017-09-13 PROCEDURE — 82550 ASSAY OF CK (CPK): CPT | Performed by: EMERGENCY MEDICINE

## 2017-09-13 PROCEDURE — 85025 COMPLETE CBC W/AUTO DIFF WBC: CPT | Performed by: EMERGENCY MEDICINE

## 2017-09-13 PROCEDURE — 84484 ASSAY OF TROPONIN QUANT: CPT | Performed by: EMERGENCY MEDICINE

## 2017-09-13 PROCEDURE — 93005 ELECTROCARDIOGRAM TRACING: CPT | Performed by: EMERGENCY MEDICINE

## 2017-09-13 PROCEDURE — 93010 ELECTROCARDIOGRAM REPORT: CPT | Performed by: INTERNAL MEDICINE

## 2017-09-13 PROCEDURE — 83605 ASSAY OF LACTIC ACID: CPT | Performed by: EMERGENCY MEDICINE

## 2017-09-13 PROCEDURE — 87040 BLOOD CULTURE FOR BACTERIA: CPT | Performed by: EMERGENCY MEDICINE

## 2017-09-13 PROCEDURE — 25010000002 METHYLPREDNISOLONE PER 125 MG: Performed by: INTERNAL MEDICINE

## 2017-09-13 PROCEDURE — 81003 URINALYSIS AUTO W/O SCOPE: CPT | Performed by: EMERGENCY MEDICINE

## 2017-09-13 PROCEDURE — 71010 HC CHEST PA OR AP: CPT

## 2017-09-13 PROCEDURE — 80053 COMPREHEN METABOLIC PANEL: CPT | Performed by: EMERGENCY MEDICINE

## 2017-09-13 PROCEDURE — 94640 AIRWAY INHALATION TREATMENT: CPT

## 2017-09-13 PROCEDURE — 83880 ASSAY OF NATRIURETIC PEPTIDE: CPT | Performed by: EMERGENCY MEDICINE

## 2017-09-13 PROCEDURE — 99284 EMERGENCY DEPT VISIT MOD MDM: CPT

## 2017-09-13 RX ORDER — IPRATROPIUM BROMIDE AND ALBUTEROL SULFATE 2.5; .5 MG/3ML; MG/3ML
3 SOLUTION RESPIRATORY (INHALATION) ONCE
Status: COMPLETED | OUTPATIENT
Start: 2017-09-13 | End: 2017-09-13

## 2017-09-13 RX ORDER — IPRATROPIUM BROMIDE AND ALBUTEROL SULFATE 2.5; .5 MG/3ML; MG/3ML
3 SOLUTION RESPIRATORY (INHALATION)
Status: DISCONTINUED | OUTPATIENT
Start: 2017-09-13 | End: 2017-09-14 | Stop reason: SDUPTHER

## 2017-09-13 RX ORDER — MORPHINE SULFATE 2 MG/ML
1 INJECTION, SOLUTION INTRAMUSCULAR; INTRAVENOUS
Status: DISCONTINUED | OUTPATIENT
Start: 2017-09-13 | End: 2017-09-16 | Stop reason: HOSPADM

## 2017-09-13 RX ORDER — ESCITALOPRAM OXALATE 10 MG/1
10 TABLET ORAL DAILY
Status: DISCONTINUED | OUTPATIENT
Start: 2017-09-13 | End: 2017-09-16 | Stop reason: HOSPADM

## 2017-09-13 RX ORDER — TERAZOSIN 1 MG/1
1 CAPSULE ORAL EVERY 12 HOURS SCHEDULED
Status: DISCONTINUED | OUTPATIENT
Start: 2017-09-13 | End: 2017-09-16 | Stop reason: HOSPADM

## 2017-09-13 RX ORDER — BENZONATATE 100 MG/1
100 CAPSULE ORAL 3 TIMES DAILY PRN
Status: DISCONTINUED | OUTPATIENT
Start: 2017-09-13 | End: 2017-09-16 | Stop reason: HOSPADM

## 2017-09-13 RX ORDER — CALCIUM CARBONATE 200(500)MG
1 TABLET,CHEWABLE ORAL 2 TIMES DAILY PRN
Status: DISCONTINUED | OUTPATIENT
Start: 2017-09-13 | End: 2017-09-16 | Stop reason: HOSPADM

## 2017-09-13 RX ORDER — GUAIFENESIN 600 MG/1
1200 TABLET, EXTENDED RELEASE ORAL EVERY 12 HOURS
Status: DISCONTINUED | OUTPATIENT
Start: 2017-09-13 | End: 2017-09-14

## 2017-09-13 RX ORDER — ACETAMINOPHEN 325 MG/1
650 TABLET ORAL EVERY 6 HOURS PRN
Status: DISCONTINUED | OUTPATIENT
Start: 2017-09-13 | End: 2017-09-16 | Stop reason: HOSPADM

## 2017-09-13 RX ORDER — BISACODYL 10 MG
10 SUPPOSITORY, RECTAL RECTAL DAILY PRN
Status: DISCONTINUED | OUTPATIENT
Start: 2017-09-13 | End: 2017-09-16 | Stop reason: HOSPADM

## 2017-09-13 RX ORDER — ONDANSETRON 2 MG/ML
4 INJECTION INTRAMUSCULAR; INTRAVENOUS EVERY 6 HOURS PRN
Status: DISCONTINUED | OUTPATIENT
Start: 2017-09-13 | End: 2017-09-16 | Stop reason: HOSPADM

## 2017-09-13 RX ORDER — SODIUM CHLORIDE 0.9 % (FLUSH) 0.9 %
1-10 SYRINGE (ML) INJECTION AS NEEDED
Status: DISCONTINUED | OUTPATIENT
Start: 2017-09-13 | End: 2017-09-16 | Stop reason: HOSPADM

## 2017-09-13 RX ORDER — NALOXONE HCL 0.4 MG/ML
0.4 VIAL (ML) INJECTION
Status: DISCONTINUED | OUTPATIENT
Start: 2017-09-13 | End: 2017-09-16 | Stop reason: HOSPADM

## 2017-09-13 RX ORDER — POLYETHYLENE GLYCOL 3350 17 G/17G
17 POWDER, FOR SOLUTION ORAL DAILY
Status: DISCONTINUED | OUTPATIENT
Start: 2017-09-13 | End: 2017-09-16 | Stop reason: HOSPADM

## 2017-09-13 RX ORDER — METHYLPREDNISOLONE SODIUM SUCCINATE 125 MG/2ML
60 INJECTION, POWDER, LYOPHILIZED, FOR SOLUTION INTRAMUSCULAR; INTRAVENOUS EVERY 6 HOURS
Status: DISCONTINUED | OUTPATIENT
Start: 2017-09-13 | End: 2017-09-14

## 2017-09-13 RX ORDER — ALBUTEROL SULFATE 2.5 MG/3ML
2.5 SOLUTION RESPIRATORY (INHALATION) EVERY 6 HOURS PRN
Status: DISCONTINUED | OUTPATIENT
Start: 2017-09-13 | End: 2017-09-16 | Stop reason: HOSPADM

## 2017-09-13 RX ORDER — HYDROCORTISONE ACETATE 25 MG/1
25 SUPPOSITORY RECTAL 2 TIMES DAILY
Status: DISCONTINUED | OUTPATIENT
Start: 2017-09-13 | End: 2017-09-16 | Stop reason: HOSPADM

## 2017-09-13 RX ORDER — IPRATROPIUM BROMIDE AND ALBUTEROL SULFATE 2.5; .5 MG/3ML; MG/3ML
3 SOLUTION RESPIRATORY (INHALATION)
Status: DISCONTINUED | OUTPATIENT
Start: 2017-09-13 | End: 2017-09-16 | Stop reason: HOSPADM

## 2017-09-13 RX ORDER — SODIUM CHLORIDE 0.9 % (FLUSH) 0.9 %
10 SYRINGE (ML) INJECTION AS NEEDED
Status: DISCONTINUED | OUTPATIENT
Start: 2017-09-13 | End: 2017-09-16 | Stop reason: HOSPADM

## 2017-09-13 RX ADMIN — SODIUM CHLORIDE 1000 ML: 9 INJECTION, SOLUTION INTRAVENOUS at 13:27

## 2017-09-13 RX ADMIN — HYDROCORTISONE ACETATE 25 MG: 25 SUPPOSITORY RECTAL at 21:22

## 2017-09-13 RX ADMIN — POLYETHYLENE GLYCOL 3350 17 G: 17 POWDER, FOR SOLUTION ORAL at 21:24

## 2017-09-13 RX ADMIN — GUAIFENESIN 1200 MG: 600 TABLET, EXTENDED RELEASE ORAL at 21:21

## 2017-09-13 RX ADMIN — METHYLPREDNISOLONE SODIUM SUCCINATE 60 MG: 125 INJECTION, POWDER, FOR SOLUTION INTRAMUSCULAR; INTRAVENOUS at 23:40

## 2017-09-13 RX ADMIN — IPRATROPIUM BROMIDE AND ALBUTEROL SULFATE 3 ML: .5; 3 SOLUTION RESPIRATORY (INHALATION) at 13:25

## 2017-09-13 RX ADMIN — IPRATROPIUM BROMIDE AND ALBUTEROL SULFATE 3 ML: .5; 3 SOLUTION RESPIRATORY (INHALATION) at 20:44

## 2017-09-13 RX ADMIN — ESCITALOPRAM OXALATE 10 MG: 10 TABLET ORAL at 21:20

## 2017-09-13 RX ADMIN — IPRATROPIUM BROMIDE AND ALBUTEROL SULFATE 3 ML: .5; 3 SOLUTION RESPIRATORY (INHALATION) at 23:07

## 2017-09-13 NOTE — ED PROVIDER NOTES
EMERGENCY DEPARTMENT ENCOUNTER    CHIEF COMPLAINT  Chief Complaint: SOA  History given by: EMS  History limited by: Deafness  Room Number: 10/10  PMD: Kevin Villatoro MD      HPI:  Pt is a 77 y.o. male who presents to the ED via EMS for SOA, onset today. Per EMS, pt was sent to the ED from his home because he was unable to care for himself, and smelled strongly of urine. Pt denies being in any pain. Per phone interview with son, he brought the pt home from the [place] last night and the pt had generalized weakness and confusion. The son states the pt is usually on 5L of air at night, and starting yesterday night the pt was having increased SOA. He stated that usually the pt can walk at baseline, but last night he had to carry the pt. Home health nurse saw him today and recommended he come to ED for his SOA    Duration:  PTA  Onset: gradual  Timing: constant  Quality: SOA  Intensity/Severity: moderate  Progression: unchanged  Associated Symptoms: generalized weakness, confusion  Aggravating Factors: none  Alleviating Factors: none  Previous Episodes: None stated  Treatment before arrival: The pt is usually on 5L at night    PAST MEDICAL HISTORY  Active Ambulatory Problems     Diagnosis Date Noted   • Acute respiratory failure with hypoxia and hypercapnia 07/05/2016   • Acute respiratory failure with hypoxia 07/17/2017   • Lower GI bleed 08/17/2017   • Chronic respiratory failure with hypoxia and hypercapnia 08/17/2017   • Bilateral deafness 08/17/2017   • COPD (chronic obstructive pulmonary disease) 08/17/2017   • Dysphagia 08/17/2017   • S/P percutaneous endoscopic gastrostomy (PEG) tube placement 08/17/2017   • Malnutrition 08/18/2017     Resolved Ambulatory Problems     Diagnosis Date Noted   • No Resolved Ambulatory Problems     Past Medical History:   Diagnosis Date   • AAA (abdominal aortic aneurysm)    • Asthma    • COPD (chronic obstructive pulmonary disease)    • Deaf    • Dysphagia    • Emphysema lung     • History of herniorrhaphy    • Hypotension    • Neuralgia    • Pneumonia    • Pneumothorax    • Skin cancer    • Trigeminal neuralgia        PAST SURGICAL HISTORY  Past Surgical History:   Procedure Laterality Date   • CHEST TUBE INSERTION     • COLONOSCOPY N/A 8/18/2017    Procedure: COLONOSCOPY TO CECUM;  Surgeon: Myesha Ulloa MD;  Location: [facility] ENDOSCOPY;  Service:    • ENDOSCOPY WITH GASTROSTOMY TUBE INSERTION N/A 7/10/2016    Procedure: ESOPHAGOGASTRODUODENOSCOPY WITH GASTROSTOMY TUBE INSERTION;  Surgeon: Nigel Valente MD;  Location: [facility] ENDOSCOPY;  Service:    • HERNIA REPAIR         FAMILY HISTORY  No family history on file.    SOCIAL HISTORY  Social History     Social History   • Marital status:      Spouse name: N/A   • Number of children: N/A   • Years of education: N/A     Occupational History   • Not on file.     Social History Main Topics   • Smoking status: Former Smoker   • Smokeless tobacco: Not on file   • Alcohol use No   • Drug use: No   • Sexual activity: Defer     Other Topics Concern   • Not on file     Social History Narrative       ALLERGIES  Review of patient's allergies indicates no known allergies.    REVIEW OF SYSTEMS  Review of Systems   Unable to perform ROS: Acuity of condition (Pt is deaf)   Respiratory: Positive for shortness of breath.    Neurological: Positive for weakness (generalized).   Psychiatric/Behavioral: Positive for confusion.       PHYSICAL EXAM  ED Triage Vitals   Temp Heart Rate Resp BP SpO2   -- 09/13/17 1255 09/13/17 1255 09/13/17 1255 09/13/17 1255    76 18 116/50 99 %      Temp src Heart Rate Source Patient Position BP Location FiO2 (%)   -- -- -- -- --              Physical Exam   Constitutional: He is well-developed, well-nourished, and in no distress. He appears cachectic.   Smelled of urine and diaper was saturated with urine   HENT:   Head: Normocephalic and atraumatic.   Mouth/Throat: Mucous membranes are dry.   Eyes: EOM are normal.  Pupils are equal, round, and reactive to light. Right eye exhibits discharge (purulent). Left eye exhibits discharge (purulent).   Neck: Normal range of motion. Neck supple.   Cardiovascular: Normal rate, regular rhythm and normal heart sounds.    Pulmonary/Chest: Effort normal. No respiratory distress. He has wheezes (faint, bilaterally).   75% O2 saturation on  RA   Abdominal: Soft. There is no tenderness. There is no rebound and no guarding.   G-tube in place   Musculoskeletal: Normal range of motion. He exhibits no edema.   Neurological: He has normal sensation and normal strength.   Follows all commands, normal strength in all extremities   Skin: Skin is warm and dry.   Several areas of superficial skin breakdown on buttocks    Nursing note and vitals reviewed.      LAB RESULTS  Lab Results (last 24 hours)     Procedure Component Value Units Date/Time    CBC & Differential [[id number]] Collected:  09/13/17 1319    Specimen:  Blood Updated:  09/13/17 1346    Narrative:       The following orders were created for panel order CBC & Differential.  Procedure                               Abnormality         Status                     ---------                               -----------         ------                     Scan Slide[[id number]]                                                                  CBC Auto Differential[[id number]]        Abnormal            Final result                 Please view results for these tests on the individual orders.    CBC Auto Differential [[id number]]  (Abnormal) Collected:  09/13/17 1319    Specimen:  Blood Updated:  09/13/17 1346     WBC 6.61 10*3/mm3      RBC 3.85 (L) 10*6/mm3      Hemoglobin 13.5 (L) g/dL      Hematocrit 44.3 %      .1 (H) fL      MCH 35.1 (H) pg      MCHC 30.5 (L) g/dL      RDW 13.6 %      RDW-SD 56.9 (H) fl      MPV 10.8 fL      Platelets 142 10*3/mm3      Neutrophil % 76.9 (H) %      Lymphocyte % 10.4 (L) %      Monocyte % 9.5 %      Eosinophil %  2.6 %      Basophil % 0.3 %      Immature Grans % 0.3 %      Neutrophils, Absolute 5.08 10*3/mm3      Lymphocytes, Absolute 0.69 (L) 10*3/mm3      Monocytes, Absolute 0.63 10*3/mm3      Eosinophils, Absolute 0.17 10*3/mm3      Basophils, Absolute 0.02 10*3/mm3      Immature Grans, Absolute 0.02 10*3/mm3     Blood Culture [[id number]] Collected:  09/13/17 1320    Specimen:  Blood from Arm, Left Updated:  09/13/17 1329    Lactic Acid, Plasma [[id number]]  (Normal) Collected:  09/13/17 1320    Specimen:  Blood Updated:  09/13/17 1350     Lactate 1.7 mmol/L     Urinalysis With / Culture If Indicated [[id number]]  (Abnormal) Collected:  09/13/17 1322    Specimen:  Urine from Urine, Clean Catch Updated:  09/13/17 1345     Color, UA Yellow     Appearance, UA Clear     pH, UA 6.5     Specific Gravity, UA 1.010     Glucose, UA Negative     Ketones, UA Negative     Bilirubin, UA Negative     Blood, UA Negative     Protein, UA Trace (A)     Leuk Esterase, UA Negative     Nitrite, UA Negative     Urobilinogen, UA 0.2 E.U./dL    Narrative:       Urine microscopic not indicated.    Blood Culture [[id number]] Collected:  09/13/17 1329    Specimen:  Blood from Arm, Left Updated:  09/13/17 1329    Comprehensive Metabolic Panel [[id number]]  (Abnormal) Collected:  09/13/17 1339    Specimen:  Blood Updated:  09/13/17 1411     Glucose 107 (H) mg/dL      BUN 29 (H) mg/dL      Creatinine 0.62 (L) mg/dL      Sodium 150 (H) mmol/L      Potassium 4.5 mmol/L      Chloride 102 mmol/L      CO2 40.0 (H) mmol/L      Calcium 9.8 mg/dL      Total Protein 7.4 g/dL      Albumin 3.80 g/dL      ALT (SGPT) 203 (H) U/L      AST (SGOT) 160 (H) U/L      Alkaline Phosphatase 118 (H) U/L      Total Bilirubin 0.7 mg/dL      eGFR Non African Amer 125 mL/min/1.73      Globulin 3.6 gm/dL      A/G Ratio 1.1 g/dL      BUN/Creatinine Ratio 46.8 (H)     Anion Gap 8.0 mmol/L     Narrative:       The MDRD GFR formula is only valid for adults with stable renal function  between ages 18 and 70.    BNP [[id number]]  (Normal) Collected:  09/13/17 1339    Specimen:  Blood Updated:  09/13/17 1409     proBNP 338.6 pg/mL     Narrative:       Among patients with dyspnea, NT-proBNP is highly sensitive for the detection of acute congestive heart failure. In addition NT-proBNP of <300 pg/ml effectively rules out acute congestive heart failure with 99% negative predictive value.    CK [[id number]]  (Normal) Collected:  09/13/17 1339    Specimen:  Blood Updated:  09/13/17 1410     Creatine Kinase 36 U/L     Troponin [[id number]]  (Normal) Collected:  09/13/17 1339    Specimen:  Blood Updated:  09/13/17 1410     Troponin T 0.015 ng/mL     Narrative:       Troponin T Reference Ranges:  Less than 0.03 ng/mL:    Negative for AMI  0.03 to 0.09 ng/mL:      Indeterminant for AMI  Greater than 0.09 ng/mL: Positive for AMI          I ordered the above labs and reviewed the results    RADIOLOGY  XR Chest 1 View   Final Result   No focal pulmonary consolidation. Chronic-appearing changes.   Follow-up as clinical indications persist.       This report was finalized on 9/13/2017 1:45 PM by Dr. Marek Plummer MD.               I ordered the above noted radiological studies. Interpreted by radiologist. Reviewed by me in PACS.       PROCEDURES  Procedures    EKG           EKG time: 1417  Rhythm/Rate: 72, nsr  P waves and DC: regular, varying NI  QRS, axis: LAD, incomplete RBBB, normal QRS  ST and T waves: nonspecific T wave changes     Artifact present    Interpreted Contemporaneously by me, independently viewed  unchanged compared to prior 8/17/17      PROGRESS AND CONSULTS  ED Course   Value Comment By Time   ALT (SGPT): (!) 203 , AST 77,  on 8/17/17 Reilly Starr MD 09/13 1413   Hemoglobin: (!) 13.5 10.4 three wks ago Reilly Starr MD 09/13 1414     1316 - Lab work, EKG, and CXR ordered for further evaluation. IVF ordered for hydration.    1452 - Rechecked pt. Pt is asleep with  100% O2 saturations on 5L air, HR 65 /68, and is no longer in respiratory distress.    1502 - Tried to contact the pt's son who did not answer. Consult placed with [facility].    1514 - Spoke at length with the pt's son Shade Arana on the phone. Informed son of the course of care so far including plans for admission. Son understands and agrees with plan. All questions answered.     1521 - Consulted with Dr. Mcdonald ([facility]) who agrees to admit the pt.     MEDICAL DECISION MAKING  Results were reviewed/discussed with the patient and they were also made aware of online access. Pt also made aware that some labs, such as cultures, will not be resulted during ER visit and follow up with PMD is necessary.     MDM  Number of Diagnoses or Management Options  Acute on chronic respiratory failure with hypoxia:   COPD with acute exacerbation:   Elevated LFTs:   Failure to thrive in adult:   Diagnosis management comments: Patient was initially tachypneic with wheezes.  He was hypoxic on room air.  He was requiring more supplemental oxygen then usual to maintain his O2 sats.  Chest x-ray did not show any acute abnormalities.  Patient was given a nebulizer treatment.  His labs were remarkable for an elevated BUN as well as elevated LFTs.  Troponin was negative.  EKG was unchanged.  Case was discussed with Dr. Mcdonald and she agreed to admit the patient. According to the patient's son, the patient has had increased weakness, confusion, and shortness of breath since yesterday.       Amount and/or Complexity of Data Reviewed  Clinical lab tests: ordered and reviewed (ALT - 203  Hemoglobin - 13.5  Troponin - negative)  Tests in the radiology section of CPT®: ordered and reviewed (CXR - No focal pulmonary consolidation. )  Tests in the medicine section of CPT®: ordered and reviewed (See note.)  Obtain history from someone other than the patient: yes (ED nurse  EMS  Son (via telephone))  Review and summarize past medical records: yes (Pt  has had 3 prior admissions in the past 2.5 months. Pt was admitted from 8/17 - 8/22/17 for lower GI bleed, pt had colponsocpy which showed diverticulosis and a bleeding internal hemorrhoid and was discharged to [place].)  Discuss the patient with other providers: yes (Dr. Mcdonald ([facility]))           DIAGNOSIS  Final diagnoses:   COPD with acute exacerbation   Failure to thrive in adult   Elevated LFTs   Acute on chronic respiratory failure with hypoxia       DISPOSITION  ADMISSION    Discussed treatment plan and reason for admission with pt/family and admitting physician.  Pt/family voiced understanding of the plan for admission for further testing/treatment as needed.         Latest Documented Vital Signs:  As of 3:43 PM  BP- 116/50 HR- 73 Temp-   O2 sat- 99%    --  Documentation assistance provided by jose Guerrero for Dr. Starr.  Information recorded by the scribe was done at my direction and has been verified and validated by me.     Justice Guerrero  09/13/17 1140       Reilly Starr MD  09/13/17 [id number]

## 2017-09-13 NOTE — H&P
PCP: Kevin Villatoro MD    Chief complaint   Chief Complaint   Patient presents with   • Altered Mental Status     discharged from nursing home yesterday for home treatment.        HPI  Patient is a 77 y.o. male DEAF presents From home after being discharged from nursing home yesterday.  Patient was just discharged from here a couple weeks ago due to GI bleed where he underwent a colonoscopy.  He was at [facility] home and recently discharged back into the son's care.  The son has taken care of him for about 13 years now.  Patient has poor historian but he had increasing shortness of breath and was confused and generally weak and he is on 3 L chronic O2.  His depends were soaked in urine EMS was called and he was on 10 L oxygen and wheezing.      PAST MEDICAL HISTORY  Past Medical History:   Diagnosis Date   • AAA (abdominal aortic aneurysm)    • Asthma    • COPD (chronic obstructive pulmonary disease)    • Deaf    • Dysphagia    • Emphysema lung    • History of herniorrhaphy    • Hypotension    • Neuralgia    • Pneumonia    • Pneumothorax    • Skin cancer     skin   • Trigeminal neuralgia        PAST SURGICAL HISTORY  Past Surgical History:   Procedure Laterality Date   • CHEST TUBE INSERTION     • COLONOSCOPY N/A 8/18/2017    Procedure: COLONOSCOPY TO CECUM;  Surgeon: Myesha Ulloa MD;  Location: [facility] ENDOSCOPY;  Service:    • ENDOSCOPY WITH GASTROSTOMY TUBE INSERTION N/A 7/10/2016    Procedure: ESOPHAGOGASTRODUODENOSCOPY WITH GASTROSTOMY TUBE INSERTION;  Surgeon: Nigel Valente MD;  Location: [facility] ENDOSCOPY;  Service:    • HERNIA REPAIR         FAMILY HISTORY  History reviewed. No pertinent family history.    SOCIAL HISTORY  Social History   Substance Use Topics   • Smoking status: Former Smoker   • Smokeless tobacco: None   • Alcohol use No       MEDICATIONS:  Prescriptions Prior to Admission   Medication Sig Dispense Refill Last Dose   • escitalopram (LEXAPRO) 10 MG tablet Take 10 mg by mouth Daily.     "  • guaiFENesin (ROBITUSSIN) 100 MG/5ML syrup Take 20 mg by mouth 2 (Two) Times a Day As Needed for Cough.      • hydrocortisone (ANUSOL-HC) 2.5 % rectal cream Insert  into the rectum 2 (Two) Times a Day.  0    • hydrocortisone (ANUSOL-HC) 25 MG suppository Insert 1 suppository into the rectum 2 (Two) Times a Day.  0    • ipratropium-albuterol (DUO-NEB) 0.5-2.5 mg/mL nebulizer Take 3 mL by nebulization Every 4 (Four) Hours As Needed for Wheezing or Shortness of Air.   7/17/2017 at 1600   • ipratropium-albuterol (DUO-NEB) 0.5-2.5 mg/mL nebulizer Take 3 mL by nebulization 4 (Four) Times a Day.      • NON FORMULARY Apply 1 application topically 2 (Two) Times a Day. Remedy Phytoplex Z-guard 575 topical pasta - apply thin layer to wound on coccyx and red area until healed      • Nutritional Supplements (JEVITY 1.5 KENAN) liquid 55 mL by Per G Tube route Continuous. 55 cc/hr      • polyethylene glycol (MIRALAX) packet Take 17 g by mouth Daily.      • terazosin (HYTRIN) 1 MG capsule 1 capsule by Per G Tube route Every 12 (Twelve) Hours. 60 capsule 1        Allergies:  Review of patient's allergies indicates no known allergies.    Review of Systems:  Unable to obtain due to mental status changes        Vital Signs  Temp:  [95 °F (35 °C)-97.5 °F (36.4 °C)] (P) 95 °F (35 °C)  Heart Rate:  [62-77] (P) 62  Resp:  [16-20] (P) 16  BP: (109-124)/(50-68) 109/54  Flowsheet Rows         First Filed Value    Admission Height  65\" (165.1 cm) Documented at 09/13/2017 1446    Admission Weight  104 lb 15 oz (47.6 kg) Documented at 09/13/2017 1446           Physical Exam:  General Appearance:    awake, somewhat cooperative, in no acute distress [place], wants questions , cachetic, smells of urine   Head:    Normocephalic, without obvious abnormality, atraumatic   Eyes:          sclerae normal, no icterus, PERRLA   ENT:    Ears grossly intact, oral mucosa DRY, poor dentition, dry crusting lips   Neck:   No adenopathy, supple, trachea midline,  "   Back:     Normal to inspection (except bruising),    Lungs:     Decrease air movement, wheeizing throughout ,respirations tachypnic,     Heart:    Regular rhythm and normal rate,  no murmur, normal S1 and S2,   Abdomen:     Normal bowel sounds, no masses,  soft non-tender, non-distended, G-tube with small erythema around site   Extremities:   Moves all extremities , no cyanosis, no edema,             Pulses:   Pulses palpable and equal bilaterally   Skin:   No bleeding, rash, multiple bruises over arms, some on back, early decub on coccyx with excoriations    Neurologic:    Psych:   Cranial nerves 2 - 12 grossly intact, sensation intact,     Moves all extremities , equal bilateral strength 3/5    Alert and Oriented x 1, Normal Affect   LABS:  Admission on 09/13/2017   Component Date Value Ref Range Status   • Glucose 09/13/2017 107* 65 - 99 mg/dL Final   • BUN 09/13/2017 29* 8 - 23 mg/dL Final   • Creatinine 09/13/2017 0.62* 0.76 - 1.27 mg/dL Final   • Sodium 09/13/2017 150* 136 - 145 mmol/L Final   • Potassium 09/13/2017 4.5  3.5 - 5.2 mmol/L Final   • Chloride 09/13/2017 102  98 - 107 mmol/L Final   • CO2 09/13/2017 40.0* 22.0 - 29.0 mmol/L Final   • Calcium 09/13/2017 9.8  8.6 - 10.5 mg/dL Final   • Total Protein 09/13/2017 7.4  6.0 - 8.5 g/dL Final   • Albumin 09/13/2017 3.80  3.50 - 5.20 g/dL Final   • ALT (SGPT) 09/13/2017 203* 1 - 41 U/L Final   • AST (SGOT) 09/13/2017 160* 1 - 40 U/L Final   • Alkaline Phosphatase 09/13/2017 118* 39 - 117 U/L Final   • Total Bilirubin 09/13/2017 0.7  0.1 - 1.2 mg/dL Final   • eGFR Non African Amer 09/13/2017 125  >60 mL/min/1.73 Final   • Globulin 09/13/2017 3.6  gm/dL Final   • A/G Ratio 09/13/2017 1.1  g/dL Final   • BUN/Creatinine Ratio 09/13/2017 46.8* 7.0 - 25.0 Final   • Anion Gap 09/13/2017 8.0  mmol/L Final   • proBNP 09/13/2017 338.6  0.0 - 1800.0 pg/mL Final   • Creatine Kinase 09/13/2017 36  20 - 200 U/L Final   • Troponin T 09/13/2017 0.015  0.000 - 0.030 ng/mL  Final   • Color, UA 09/13/2017 Yellow  Yellow, Straw Final   • Appearance, UA 09/13/2017 Clear  Clear Final   • pH, UA 09/13/2017 6.5  5.0 - 8.0 Final   • Specific Gravity, UA 09/13/2017 1.010  1.005 - 1.030 Final   • Glucose, UA 09/13/2017 Negative  Negative Final   • Ketones, UA 09/13/2017 Negative  Negative Final   • Bilirubin, UA 09/13/2017 Negative  Negative Final   • Blood, UA 09/13/2017 Negative  Negative Final   • Protein, UA 09/13/2017 Trace* Negative Final   • Leuk Esterase, UA 09/13/2017 Negative  Negative Final   • Nitrite, UA 09/13/2017 Negative  Negative Final   • Urobilinogen, UA 09/13/2017 0.2 E.U./dL  0.2 - 1.0 E.U./dL Final   • WBC 09/13/2017 6.61  4.50 - 10.70 10*3/mm3 Final   • RBC 09/13/2017 3.85* 4.60 - 6.00 10*6/mm3 Final   • Hemoglobin 09/13/2017 13.5* 13.7 - 17.6 g/dL Final   • Hematocrit 09/13/2017 44.3  40.4 - 52.2 % Final   • MCV 09/13/2017 115.1* 79.8 - 96.2 fL Final   • MCH 09/13/2017 35.1* 27.0 - 32.7 pg Final   • MCHC 09/13/2017 30.5* 32.6 - 36.4 g/dL Final   • RDW 09/13/2017 13.6  11.5 - 14.5 % Final   • RDW-SD 09/13/2017 56.9* 37.0 - 54.0 fl Final   • MPV 09/13/2017 10.8  6.0 - 12.0 fL Final   • Platelets 09/13/2017 142  140 - 500 10*3/mm3 Final   • Neutrophil % 09/13/2017 76.9* 42.7 - 76.0 % Final   • Lymphocyte % 09/13/2017 10.4* 19.6 - 45.3 % Final   • Monocyte % 09/13/2017 9.5  5.0 - 12.0 % Final   • Eosinophil % 09/13/2017 2.6  0.3 - 6.2 % Final   • Basophil % 09/13/2017 0.3  0.0 - 1.5 % Final   • Immature Grans % 09/13/2017 0.3  0.0 - 0.5 % Final   • Neutrophils, Absolute 09/13/2017 5.08  1.90 - 8.10 10*3/mm3 Final   • Lymphocytes, Absolute 09/13/2017 0.69* 0.90 - 4.80 10*3/mm3 Final   • Monocytes, Absolute 09/13/2017 0.63  0.20 - 1.20 10*3/mm3 Final   • Eosinophils, Absolute 09/13/2017 0.17  0.00 - 0.70 10*3/mm3 Final   • Basophils, Absolute 09/13/2017 0.02  0.00 - 0.20 10*3/mm3 Final   • Immature Grans, Absolute 09/13/2017 0.02  0.00 - 0.03 10*3/mm3 Final   • Lactate  09/13/2017 1.7  0.5 - 2.0 mmol/L Final       DIAGNOSTICS:  Xr Chest 1 View    Result Date: 9/13/2017  Narrative: XR CHEST 1 VW-  HISTORY: Male who is 77 years-old,  short of breath    TECHNIQUE: Frontal views of the chest  COMPARISON:     08/18/2017  FINDINGS: Heart, mediastinum and pulmonary vasculature are unremarkable. Chronic emphysematous changes are apparent, predominantly in the upper lungs. No focal pulmonary consolidation, pleural effusion, or pneumothorax. Pulmonary hyperinflation suggests COPD. Old granulomatous disease is seen. No acute osseous process.      Impression: No focal pulmonary consolidation. Chronic-appearing changes. Follow-up as clinical indications persist.  This report was finalized on 9/13/2017 1:45 PM by Dr. Marek Plummer MD.           Results Review:   I reviewed the patient's new clinical results.  Discussed with ER physician  I personally viewed and interpreted the patient's EKG/Telemetry data- sinus  Old records reviewed -see above    ASSESSMENT AND PLAN    +  Acute on chronic respiratory failure/COPD with acute exacerbation  -Treat COPD: iv steroids, scheduled Duonebs, Albuterol MN prn, Incentive Spirometry  -Wean oxygen to keep O2 sats 88-92 percent  -turn and cough, sputum culture  -Consider chest physiotherapy    +Acute metabolic encephalopathy  -Multifactorial  -Treat underlying medical problems as indicated      +Dysphagia/  S/P percutaneous endoscopic gastrostomy (PEG) tube placement/  Malnutrition  -Continue tube feeds but needs to have free water as well  -nutrition to evaluate  -Check.  preAlbumin in the morning    +  Hypernatremia/Dehydration  -Does not appear that patient is getting free water will schedule more for the next 24 hours and then decrease     +  Bilateral deafness     -May need to write answers and questions     +  Hepatitis  -Slightly elevated with an ALT of 110, AST of 77 on previous admission but now 200 and 160  -We'll check hepatitis panel,  consider medication    + DVT prophylaxis-Lovenox 30  + Decision maker felicity sullivan  + With strongly consider getting a palliative care consult for goals of care patient likely on a downward decline, poor long-term prognosis    I discussed the patients findings and my recommendations with the patient and/or family.  Please reference all orders placed.    Bee Mcdonald MD  09/13/17  7:57 PM

## 2017-09-13 NOTE — PLAN OF CARE
Problem: Patient Care Overview (Adult)  Goal: Plan of Care Review  Outcome: Ongoing (interventions implemented as appropriate)    09/13/17 1920   Coping/Psychosocial Response Interventions   Plan Of Care Reviewed With patient   Patient Care Overview   Progress no change   Outcome Evaluation   Outcome Summary/Follow up Plan await md orders

## 2017-09-14 LAB
ALBUMIN SERPL-MCNC: 2.9 G/DL (ref 3.5–5.2)
ALBUMIN/GLOB SERPL: 0.9 G/DL
ALP SERPL-CCNC: 103 U/L (ref 39–117)
ALT SERPL W P-5'-P-CCNC: 163 U/L (ref 1–41)
ANION GAP SERPL CALCULATED.3IONS-SCNC: 9.4 MMOL/L
AST SERPL-CCNC: 134 U/L (ref 1–40)
BASOPHILS # BLD AUTO: 0.01 10*3/MM3 (ref 0–0.2)
BASOPHILS NFR BLD AUTO: 0.3 % (ref 0–1.5)
BILIRUB SERPL-MCNC: 0.6 MG/DL (ref 0.1–1.2)
BUN BLD-MCNC: 28 MG/DL (ref 8–23)
BUN/CREAT SERPL: 49.1 (ref 7–25)
CALCIUM SPEC-SCNC: 9 MG/DL (ref 8.6–10.5)
CHLORIDE SERPL-SCNC: 104 MMOL/L (ref 98–107)
CO2 SERPL-SCNC: 32.6 MMOL/L (ref 22–29)
CREAT BLD-MCNC: 0.57 MG/DL (ref 0.76–1.27)
DEPRECATED RDW RBC AUTO: 55.3 FL (ref 37–54)
EOSINOPHIL # BLD AUTO: 0.01 10*3/MM3 (ref 0–0.7)
EOSINOPHIL NFR BLD AUTO: 0.3 % (ref 0.3–6.2)
ERYTHROCYTE [DISTWIDTH] IN BLOOD BY AUTOMATED COUNT: 13.2 % (ref 11.5–14.5)
GFR SERPL CREATININE-BSD FRML MDRD: 138 ML/MIN/1.73
GLOBULIN UR ELPH-MCNC: 3.3 GM/DL
GLUCOSE BLD-MCNC: 150 MG/DL (ref 65–99)
HAV IGM SERPL QL IA: NORMAL
HBV CORE IGM SERPL QL IA: NORMAL
HBV SURFACE AG SERPL QL IA: NORMAL
HCT VFR BLD AUTO: 36.2 % (ref 40.4–52.2)
HCV AB SER DONR QL: NORMAL
HGB BLD-MCNC: 11.2 G/DL (ref 13.7–17.6)
IMM GRANULOCYTES # BLD: 0 10*3/MM3 (ref 0–0.03)
IMM GRANULOCYTES NFR BLD: 0 % (ref 0–0.5)
LYMPHOCYTES # BLD AUTO: 0.32 10*3/MM3 (ref 0.9–4.8)
LYMPHOCYTES NFR BLD AUTO: 8.5 % (ref 19.6–45.3)
MACROCYTES BLD QL SMEAR: NORMAL
MAGNESIUM SERPL-MCNC: 2.6 MG/DL (ref 1.6–2.4)
MCH RBC QN AUTO: 35.2 PG (ref 27–32.7)
MCHC RBC AUTO-ENTMCNC: 30.9 G/DL (ref 32.6–36.4)
MCV RBC AUTO: 113.8 FL (ref 79.8–96.2)
MONOCYTES # BLD AUTO: 0.02 10*3/MM3 (ref 0.2–1.2)
MONOCYTES NFR BLD AUTO: 0.5 % (ref 5–12)
NEUTROPHILS # BLD AUTO: 3.4 10*3/MM3 (ref 1.9–8.1)
NEUTROPHILS NFR BLD AUTO: 90.4 % (ref 42.7–76)
PHOSPHATE SERPL-MCNC: 2.4 MG/DL (ref 2.5–4.5)
PLAT MORPH BLD: NORMAL
PLATELET # BLD AUTO: 107 10*3/MM3 (ref 140–500)
PMV BLD AUTO: 10.5 FL (ref 6–12)
POTASSIUM BLD-SCNC: 4.6 MMOL/L (ref 3.5–5.2)
PROT SERPL-MCNC: 6.2 G/DL (ref 6–8.5)
RBC # BLD AUTO: 3.18 10*6/MM3 (ref 4.6–6)
SODIUM BLD-SCNC: 146 MMOL/L (ref 136–145)
STOMATOCYTES BLD QL SMEAR: NORMAL
TROPONIN T SERPL-MCNC: <0.01 NG/ML (ref 0–0.03)
WBC MORPH BLD: NORMAL
WBC NRBC COR # BLD: 3.76 10*3/MM3 (ref 4.5–10.7)

## 2017-09-14 PROCEDURE — 85007 BL SMEAR W/DIFF WBC COUNT: CPT | Performed by: INTERNAL MEDICINE

## 2017-09-14 PROCEDURE — 25010000002 METHYLPREDNISOLONE PER 125 MG: Performed by: INTERNAL MEDICINE

## 2017-09-14 PROCEDURE — 83735 ASSAY OF MAGNESIUM: CPT | Performed by: INTERNAL MEDICINE

## 2017-09-14 PROCEDURE — 84484 ASSAY OF TROPONIN QUANT: CPT | Performed by: INTERNAL MEDICINE

## 2017-09-14 PROCEDURE — 80074 ACUTE HEPATITIS PANEL: CPT | Performed by: INTERNAL MEDICINE

## 2017-09-14 PROCEDURE — 85025 COMPLETE CBC W/AUTO DIFF WBC: CPT | Performed by: INTERNAL MEDICINE

## 2017-09-14 PROCEDURE — 84100 ASSAY OF PHOSPHORUS: CPT | Performed by: INTERNAL MEDICINE

## 2017-09-14 PROCEDURE — 94799 UNLISTED PULMONARY SVC/PX: CPT

## 2017-09-14 PROCEDURE — 63710000001 PREDNISONE PER 1 MG: Performed by: HOSPITALIST

## 2017-09-14 PROCEDURE — 25010000002 ENOXAPARIN PER 10 MG: Performed by: INTERNAL MEDICINE

## 2017-09-14 PROCEDURE — 80053 COMPREHEN METABOLIC PANEL: CPT | Performed by: INTERNAL MEDICINE

## 2017-09-14 RX ORDER — PREDNISONE 20 MG/1
40 TABLET ORAL EVERY 12 HOURS SCHEDULED
Status: DISCONTINUED | OUTPATIENT
Start: 2017-09-14 | End: 2017-09-15

## 2017-09-14 RX ADMIN — METHYLPREDNISOLONE SODIUM SUCCINATE 60 MG: 125 INJECTION, POWDER, FOR SOLUTION INTRAMUSCULAR; INTRAVENOUS at 12:57

## 2017-09-14 RX ADMIN — GUAIFENESIN 1200 MG: 600 TABLET, EXTENDED RELEASE ORAL at 05:47

## 2017-09-14 RX ADMIN — POLYETHYLENE GLYCOL 3350 17 G: 17 POWDER, FOR SOLUTION ORAL at 09:23

## 2017-09-14 RX ADMIN — IPRATROPIUM BROMIDE AND ALBUTEROL SULFATE 3 ML: .5; 3 SOLUTION RESPIRATORY (INHALATION) at 10:22

## 2017-09-14 RX ADMIN — HYDROCORTISONE 2.5%: 25 CREAM TOPICAL at 09:23

## 2017-09-14 RX ADMIN — IPRATROPIUM BROMIDE AND ALBUTEROL SULFATE 3 ML: .5; 3 SOLUTION RESPIRATORY (INHALATION) at 03:38

## 2017-09-14 RX ADMIN — HYDROCORTISONE ACETATE 25 MG: 25 SUPPOSITORY RECTAL at 09:22

## 2017-09-14 RX ADMIN — TERAZOSIN HYDROCHLORIDE 1 MG: 1 CAPSULE ORAL at 09:22

## 2017-09-14 RX ADMIN — HYDROCORTISONE ACETATE 25 MG: 25 SUPPOSITORY RECTAL at 18:36

## 2017-09-14 RX ADMIN — IPRATROPIUM BROMIDE AND ALBUTEROL SULFATE 3 ML: .5; 3 SOLUTION RESPIRATORY (INHALATION) at 15:20

## 2017-09-14 RX ADMIN — IPRATROPIUM BROMIDE AND ALBUTEROL SULFATE 3 ML: .5; 3 SOLUTION RESPIRATORY (INHALATION) at 07:21

## 2017-09-14 RX ADMIN — TERAZOSIN HYDROCHLORIDE 1 MG: 1 CAPSULE ORAL at 21:32

## 2017-09-14 RX ADMIN — HYDROCORTISONE 2.5%: 25 CREAM TOPICAL at 18:36

## 2017-09-14 RX ADMIN — METHYLPREDNISOLONE SODIUM SUCCINATE 60 MG: 125 INJECTION, POWDER, FOR SOLUTION INTRAMUSCULAR; INTRAVENOUS at 05:47

## 2017-09-14 RX ADMIN — ENOXAPARIN SODIUM 30 MG: 30 INJECTION SUBCUTANEOUS at 21:32

## 2017-09-14 RX ADMIN — PREDNISONE 40 MG: 20 TABLET ORAL at 21:32

## 2017-09-14 RX ADMIN — IPRATROPIUM BROMIDE AND ALBUTEROL SULFATE 3 ML: .5; 3 SOLUTION RESPIRATORY (INHALATION) at 20:58

## 2017-09-14 RX ADMIN — GUAIFENESIN 400 MG: 100 SOLUTION ORAL at 21:32

## 2017-09-14 RX ADMIN — ESCITALOPRAM OXALATE 10 MG: 10 TABLET ORAL at 09:22

## 2017-09-14 NOTE — NURSING NOTE
Pt seen for small Stage 2 pressure injury at coccyx; silicone border drsg applied per nsg staff.  Removed drsg to assess wound;  Small, superficial with scant amt of serous drainage on drsg; surrounding skin nonblacheable.  Pt thin and frail but can easily turn self onto his side in bed.  Pump on accumax mattress.

## 2017-09-14 NOTE — PLAN OF CARE
Problem: Patient Care Overview (Adult)  Goal: Plan of Care Review  Outcome: Ongoing (interventions implemented as appropriate)    09/14/17 1340   Coping/Psychosocial Response Interventions   Plan Of Care Reviewed With patient   Patient Care Overview   Progress progress toward functional goals as expected   Outcome Evaluation   Outcome Summary/Follow up Plan VSS. pt tolerating tube feeds well. 0 c/o of pain or s/s of distress. 0 episodes of SOA. will cont to monitor        Goal: Adult Individualization and Mutuality  Outcome: Ongoing (interventions implemented as appropriate)    Problem: COPD, Chronic Bronchitis/Emphysema (Adult)  Goal: Signs and Symptoms of Listed Potential Problems Will be Absent or Manageable (COPD, Chronic Bronchitis/Emphysema)  Outcome: Ongoing (interventions implemented as appropriate)    Problem: Fall Risk (Adult)  Goal: Identify Related Risk Factors and Signs and Symptoms  Outcome: Ongoing (interventions implemented as appropriate)    Problem: Pressure Ulcer (Adult)  Goal: Signs and Symptoms of Listed Potential Problems Will be Absent or Manageable (Pressure Ulcer)  Outcome: Ongoing (interventions implemented as appropriate)    Problem: Nutrition, Enteral (Adult)  Goal: Signs and Symptoms of Listed Potential Problems Will be Absent or Manageable (Nutrition, Enteral)  Outcome: Ongoing (interventions implemented as appropriate)

## 2017-09-14 NOTE — PLAN OF CARE
Problem: Patient Care Overview (Adult)  Goal: Plan of Care Review  Outcome: Ongoing (interventions implemented as appropriate)  Goal: Adult Individualization and Mutuality    09/14/17 0352   Individualization   Patient Specific Goals try to increase pt bp to a more normal rage. pt sbp hovered around 90 for most of shift, however Pt's map was with in normal range. pt wasn't not symptomatic.   Patient Specific Interventions monitor bp often, monitor for s/s of septic shock         Problem: COPD, Chronic Bronchitis/Emphysema (Adult)  Intervention: Match Activity with Ability/Tolerance    09/14/17 0352   Activity   Activity Type activity adjusted per tolerance;dorsiflexion, plantar flexion encouraged       Intervention: Optimize Oxygenation/Ventilation/Perfusion    09/14/17 0352   Respiratory Interventions   Airway/Ventilation Management airway patency maintained;calming measures promoted   Promote Aggressive Pulmonary Hygiene/Secretion Management   Cough And Deep Breathing unable to perform         Goal: Signs and Symptoms of Listed Potential Problems Will be Absent or Manageable (COPD, Chronic Bronchitis/Emphysema)  Outcome: Ongoing (interventions implemented as appropriate)    Problem: Fall Risk (Adult)  Goal: Absence of Falls  Outcome: Outcome(s) achieved Date Met:  09/14/17

## 2017-09-14 NOTE — PROGRESS NOTES
[facility]     LOS: 1 day     Name: Francisco Arana Sr.  Age: 77 y.o.  Sex: male  :  1940  MRN: [id number]         Primary Care Physician: Kevin Villatoro MD    Jevity 1.5 (High Sukhjinder High Pro With Fiber); Tube Feeding Type: Continuous; Continuous Tube Feeding Start Rate (mL/hr): 55; Then Advance Rate By (mL/hr): Do Not Advance; Every __ Hours: Patient at Goal Rate; To Goal Rate of (mL/hr): 55; Patient is N...    Subjective   Used online . Patient says SOA is better and close to normal. No pain.    Objective   Temp:  [97.4 °F (36.3 °C)-97.5 °F (36.4 °C)] 97.4 °F (36.3 °C)  Heart Rate:  [57-77] 72  Resp:  [16-20] 16  BP: ()/(54-90) 103/55  SpO2:  [96 %-100 %] 96 %  on  Flow (L/min):  [2-4] 2;   O2 Device: nasal cannula  Body mass index is 17.46 kg/(m^2).    Physical Exam   Constitutional: No distress.   Cardiovascular: Normal rate and regular rhythm.    Pulmonary/Chest: Effort normal. No respiratory distress. He has decreased breath sounds. He has no wheezes.   Abdominal: Soft. There is no tenderness.   Musculoskeletal: He exhibits no edema.   Neurological: He is alert.   Skin: Skin is warm and dry.     Reviewed medications and new clinical results    enoxaparin 30 mg Subcutaneous Nightly   escitalopram 10 mg Oral Daily   guaiFENesin 1,200 mg Oral Q12H   hydrocortisone  Rectal BID   hydrocortisone 25 mg Rectal BID   ipratropium-albuterol 3 mL Nebulization Q4H - RT   ipratropium-albuterol 3 mL Nebulization 4x Daily - RT   methylPREDNISolone sodium succinate 60 mg Intravenous Q6H   polyethylene glycol 17 g Oral Daily   terazosin 1 mg Per G Tube Q12H      Results from last 7 days  Lab Units 17  0424 17  1319   WBC 10*3/mm3 3.76* 6.61   HEMOGLOBIN g/dL 11.2* 13.5*   PLATELETS 10*3/mm3 107* 142     Results from last 7 days  Lab Units 17  0424 17  1339   SODIUM mmol/L 146* 150*   POTASSIUM mmol/L 4.6 4.5    CHLORIDE mmol/L 104 102   CO2 mmol/L 32.6* 40.0*   BUN mg/dL 28* 29*   CREATININE mg/dL 0.57* 0.62*   CALCIUM mg/dL 9.0 9.8   GLUCOSE mg/dL 150* 107*     Estimated Creatinine Clearance: 50.4 mL/min (by C-G formula based on Cr of 0.57).    Results from last 7 days  Lab Units 09/14/17  0424 09/13/17  1339   ALK PHOS U/L 103 118*   BILIRUBIN mg/dL 0.6 0.7   ALT (SGPT) U/L 163* 203*   AST (SGOT) U/L 134* 160*     Assessment/Plan   Active Hospital Problems (** Indicates Principal Problem)    Diagnosis Date Noted   • Acute on chronic respiratory failure [J96.20] 09/13/2017   • COPD with acute exacerbation [J44.1] 09/13/2017   • Acute metabolic encephalopathy [G93.41] 09/13/2017   • Hypernatremia [E87.0] 09/13/2017   • Dehydration [E86.0] 09/13/2017   • Hepatitis [K75.9] 09/13/2017   • Malnutrition [E46] 08/18/2017   • S/P percutaneous endoscopic gastrostomy (PEG) tube placement [Z93.1] 08/17/2017   • Dysphagia [R13.10] 08/17/2017   • 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     · Change steroids to PO  · Continue to monitor LFTs  · Hypernatremia improved     Julian Osullivan MD   09/14/17  2:13 PM

## 2017-09-14 NOTE — CONSULTS
"Adult Nutrition  Assessment/PES    Patient Name:  Francisco Arana Sr.  YOB: 1940  MRN: [id number]  Admit Date:  9/13/2017    Assessment Date:  9/14/2017     Once hypernatremia resolved, recommend: Jevity 1.5 goal rate @ 55 cc/hr, fluid flushes 25 cc q hour; moderate malnutrition         Reason for Assessment       09/14/17 1026    Reason for Assessment    Reason For Assessment/Visit identified at risk by screening criteria;nurse/nurse practitioner consult;TF/PN    Diagnosis --   acute on chronic respiratory failure                Anthropometrics       09/14/17 1026    Anthropometrics (Special Considerations)    Height Used for Calculations 1.651 m (5' 5\")    Weight Used for Calculations 47.2 kg (104 lb)    RD Calculated     RD Calculated % IBW 76    RD Calculated BMI (kg/m2) 17.4    Body Mass Index (BMI)    BMI Grade 17 - 19 low grade I            Labs/Tests/Procedures/Meds       09/14/17 1027    Labs/Tests/Procedures/Meds    Diagnostic Test/Procedure Review reviewed    Labs/Tests Review Reviewed;Glucose;Na+;Platlets;Phos;Mg++    Medication Review Reviewed, pertinent    Significant Vitals reviewed            Physical Findings       09/14/17 1027    Physical Findings/Assessment    Additional Documentation --   B=13; pressure ulcer stage 2 coccyx            Estimated/Assessed Needs       09/14/17 1028    Calculation Measurements    Weight Used For Calculations 47.2 kg (104 lb)    Height Used for Calculations 1.651 m (5' 5\")    Estimated/Assessed Energy Needs    Energy Need Method Kcal/kg    kcal/kg 35    35 Kcal/Kg (kcal) 1651.09    Estimated/Assessed Protein Needs    Weight Used for Protein Calculation 47.2 kg (104 lb)    Protein (gm/kg) 1.4    1.4 Gm Protein (gm) 66.04    Estimated Protein Range 56-66    Estimated/Assessed Fluid Needs    Fluid Need Method RDA method    RDA Method (mL)  1500            Nutrition Prescription Ordered       09/14/17 1028    Nutrition Prescription EN    Enteral " Route PEG    Product Jevity 1.5 yvrose    TF Delivery Method Continuous    Continuous TF Goal Rate (mL/hr) 55 mL/hr    Water flush (mL)  500 mL    Water Flush Frequency Every 4 hours            Evaluation of Received Nutrient/Fluid Intake       09/14/17 1029    Evaluation of Received Nutrient/Fluid Intake    Nutrition Delivered Calorie Evaluation;Protein Evaluation;Fluid Evaluation    Calorie Intake Evaluation    Enteral Calories (kcal) 1980    Total Calories (kcal) 1980    % Kcal Needs 100    Protein Intake Evaluation    Enteral Protein (gm) 84    Total Protein (gm) 84    % Protein Needs 100    Fluid Intake Evaluation    Enteral  Fluid (mL) 1003    Free Water Flush Fluid (mL) 3000    Total Fluid Intake (mL) 4003    % Fluid Needs 260              Malnutrition Severity Assessment       09/14/17 1029    Malnutrition Severity Assessment    Malnutrition Type Chronic Illness Malnutrition    Physical Signs of Malnutrition (Chronic)    Muscle Wasting Severe    Fat Loss Severe    Weight Status (Chronic)    BMI Mild (<19)   BMI-17.4    %IBW Mod <80%   76%    Criteria Met (Must meet criteria for severity in at least 2 of these categories: M Wasting, Fat Loss, Fluid, Secondary Signs, Wt. Status, Intake)    Patient meets criteria for  Moderate malnutrition          Problem/Interventions:        Problem 1       09/14/17 1030    Nutrition Diagnoses Problem 1    Problem 1 Malnutrition    Etiology (related to) MNT for Treatment/Condition    Signs/Symptoms (evidenced by) % IBW    Percent (%) IBW 76 %                    Intervention Goal       09/14/17 1030    Intervention Goal    General Maintain nutrition;Nutrition support treatment    TF/PN Adjust TF/PN;Tolerate TF at goal    Weight Appropriate weight gain            Nutrition Intervention       09/14/17 1030    Nutrition Intervention    RD/Tech Action Follow Tx progress;Care plan reviewd            Nutrition Prescription       09/14/17 1030    Nutrition Prescription EN    Enteral  Prescription Enteral begin/change    Enteral Route PEG    Product Jevity 1.5 yvrose    TF Delivery Method Continuous    Continuous TF Goal Rate (mL/hr) 55 mL/hr    Water flush (mL)  25 mL    Water Flush Frequency Per hour    New EN Prescription Ordered? No, recommended            Education/Evaluation       09/14/17 1030    Education    Education Will Instruct as appropriate    Monitor/Evaluation    Monitor Per protocol    Education Follow-up Reinforce PRN          Electronically signed by:  Naty Lan RD  09/14/17 10:31 AM

## 2017-09-15 LAB
ALBUMIN SERPL-MCNC: 2.7 G/DL (ref 3.5–5.2)
ALBUMIN/GLOB SERPL: 0.9 G/DL
ALP SERPL-CCNC: 83 U/L (ref 39–117)
ALT SERPL W P-5'-P-CCNC: 121 U/L (ref 1–41)
ANION GAP SERPL CALCULATED.3IONS-SCNC: 6.9 MMOL/L
AST SERPL-CCNC: 81 U/L (ref 1–40)
BASOPHILS # BLD AUTO: 0 10*3/MM3 (ref 0–0.2)
BASOPHILS NFR BLD AUTO: 0 % (ref 0–1.5)
BILIRUB SERPL-MCNC: 0.3 MG/DL (ref 0.1–1.2)
BUN BLD-MCNC: 28 MG/DL (ref 8–23)
BUN/CREAT SERPL: 63.6 (ref 7–25)
CALCIUM SPEC-SCNC: 8.8 MG/DL (ref 8.6–10.5)
CHLORIDE SERPL-SCNC: 99 MMOL/L (ref 98–107)
CO2 SERPL-SCNC: 34.1 MMOL/L (ref 22–29)
CREAT BLD-MCNC: 0.44 MG/DL (ref 0.76–1.27)
DEPRECATED RDW RBC AUTO: 48.9 FL (ref 37–54)
EOSINOPHIL # BLD AUTO: 0 10*3/MM3 (ref 0–0.7)
EOSINOPHIL NFR BLD AUTO: 0 % (ref 0.3–6.2)
ERYTHROCYTE [DISTWIDTH] IN BLOOD BY AUTOMATED COUNT: 12.5 % (ref 11.5–14.5)
GFR SERPL CREATININE-BSD FRML MDRD: >150 ML/MIN/1.73
GLOBULIN UR ELPH-MCNC: 3 GM/DL
GLUCOSE BLD-MCNC: 103 MG/DL (ref 65–99)
HCT VFR BLD AUTO: 31 % (ref 40.4–52.2)
HGB BLD-MCNC: 10 G/DL (ref 13.7–17.6)
IMM GRANULOCYTES # BLD: 0 10*3/MM3 (ref 0–0.03)
IMM GRANULOCYTES NFR BLD: 0 % (ref 0–0.5)
LYMPHOCYTES # BLD AUTO: 0.33 10*3/MM3 (ref 0.9–4.8)
LYMPHOCYTES NFR BLD AUTO: 6.2 % (ref 19.6–45.3)
MCH RBC QN AUTO: 34.7 PG (ref 27–32.7)
MCHC RBC AUTO-ENTMCNC: 32.3 G/DL (ref 32.6–36.4)
MCV RBC AUTO: 107.6 FL (ref 79.8–96.2)
MONOCYTES # BLD AUTO: 0.35 10*3/MM3 (ref 0.2–1.2)
MONOCYTES NFR BLD AUTO: 6.6 % (ref 5–12)
NEUTROPHILS # BLD AUTO: 4.65 10*3/MM3 (ref 1.9–8.1)
NEUTROPHILS NFR BLD AUTO: 87.2 % (ref 42.7–76)
PLATELET # BLD AUTO: 118 10*3/MM3 (ref 140–500)
PMV BLD AUTO: 10.3 FL (ref 6–12)
POTASSIUM BLD-SCNC: 4.6 MMOL/L (ref 3.5–5.2)
PROT SERPL-MCNC: 5.7 G/DL (ref 6–8.5)
RBC # BLD AUTO: 2.88 10*6/MM3 (ref 4.6–6)
SODIUM BLD-SCNC: 140 MMOL/L (ref 136–145)
WBC NRBC COR # BLD: 5.33 10*3/MM3 (ref 4.5–10.7)

## 2017-09-15 PROCEDURE — 25010000002 ENOXAPARIN PER 10 MG: Performed by: INTERNAL MEDICINE

## 2017-09-15 PROCEDURE — 85025 COMPLETE CBC W/AUTO DIFF WBC: CPT | Performed by: INTERNAL MEDICINE

## 2017-09-15 PROCEDURE — 80053 COMPREHEN METABOLIC PANEL: CPT | Performed by: HOSPITALIST

## 2017-09-15 PROCEDURE — 63710000001 PREDNISONE PER 1 MG: Performed by: HOSPITALIST

## 2017-09-15 PROCEDURE — 94799 UNLISTED PULMONARY SVC/PX: CPT

## 2017-09-15 PROCEDURE — 63710000001 PREDNISONE PER 5 MG: Performed by: HOSPITALIST

## 2017-09-15 RX ADMIN — ENOXAPARIN SODIUM 30 MG: 30 INJECTION SUBCUTANEOUS at 21:28

## 2017-09-15 RX ADMIN — HYDROCORTISONE 2.5%: 25 CREAM TOPICAL at 17:09

## 2017-09-15 RX ADMIN — IPRATROPIUM BROMIDE AND ALBUTEROL SULFATE 3 ML: .5; 3 SOLUTION RESPIRATORY (INHALATION) at 11:48

## 2017-09-15 RX ADMIN — HYDROCORTISONE 2.5%: 25 CREAM TOPICAL at 10:41

## 2017-09-15 RX ADMIN — IPRATROPIUM BROMIDE AND ALBUTEROL SULFATE 3 ML: .5; 3 SOLUTION RESPIRATORY (INHALATION) at 16:45

## 2017-09-15 RX ADMIN — GUAIFENESIN 400 MG: 100 SOLUTION ORAL at 14:42

## 2017-09-15 RX ADMIN — HYDROCORTISONE ACETATE 25 MG: 25 SUPPOSITORY RECTAL at 10:41

## 2017-09-15 RX ADMIN — GUAIFENESIN 400 MG: 100 SOLUTION ORAL at 10:41

## 2017-09-15 RX ADMIN — POLYETHYLENE GLYCOL 3350 17 G: 17 POWDER, FOR SOLUTION ORAL at 10:43

## 2017-09-15 RX ADMIN — ESCITALOPRAM OXALATE 10 MG: 10 TABLET ORAL at 10:40

## 2017-09-15 RX ADMIN — HYDROCORTISONE ACETATE 25 MG: 25 SUPPOSITORY RECTAL at 17:09

## 2017-09-15 RX ADMIN — TERAZOSIN HYDROCHLORIDE 1 MG: 1 CAPSULE ORAL at 10:40

## 2017-09-15 RX ADMIN — GUAIFENESIN 400 MG: 100 SOLUTION ORAL at 21:27

## 2017-09-15 RX ADMIN — TERAZOSIN HYDROCHLORIDE 1 MG: 1 CAPSULE ORAL at 21:27

## 2017-09-15 RX ADMIN — GUAIFENESIN 400 MG: 100 SOLUTION ORAL at 17:09

## 2017-09-15 RX ADMIN — IPRATROPIUM BROMIDE AND ALBUTEROL SULFATE 3 ML: .5; 3 SOLUTION RESPIRATORY (INHALATION) at 19:50

## 2017-09-15 RX ADMIN — PREDNISONE 40 MG: 20 TABLET ORAL at 10:41

## 2017-09-15 RX ADMIN — IPRATROPIUM BROMIDE AND ALBUTEROL SULFATE 3 ML: .5; 3 SOLUTION RESPIRATORY (INHALATION) at 07:50

## 2017-09-15 RX ADMIN — PREDNISONE 30 MG: 20 TABLET ORAL at 21:27

## 2017-09-15 NOTE — PROGRESS NOTES
[facility]     LOS: 2 days     Name: Francisco Arana Sr.  Age: 77 y.o.  Sex: male  :  1940  MRN: [id number]         Primary Care Physician: Micky Veronica MD    Jevity 1.5 (High Sukhjinder High Pro With Fiber); Tube Feeding Type: Continuous; Continuous Tube Feeding Start Rate (mL/hr): 55; Then Advance Rate By (mL/hr): Do Not Advance; Every __ Hours: Patient at Goal Rate; To Goal Rate of (mL/hr): 55; Patient is N...    Subjective   Written messages back and forth. He states his shortness of breath is improved and he feels fine and he is asking when he can go home.    Objective   Temp:  [96.6 °F (35.9 °C)-97.8 °F (36.6 °C)] 96.6 °F (35.9 °C)  Heart Rate:  [62-76] 74  Resp:  [16-20] 20  BP: ()/(50-73) 99/55  SpO2:  [95 %-98 %] 97 %  on  Flow (L/min):  [2] 2;   O2 Device: nasal cannula  Body mass index is 17.46 kg/(m^2).    Physical Exam   Constitutional: No distress.   Cardiovascular: Normal rate and regular rhythm.    Pulmonary/Chest: Effort normal. No respiratory distress. He has decreased breath sounds. He has no wheezes.   Abdominal: Soft. There is no tenderness.   Musculoskeletal: He exhibits no edema.   Neurological: He is alert.   Skin: Skin is warm and dry.     Reviewed medications and new clinical results    enoxaparin 30 mg Subcutaneous Nightly   escitalopram 10 mg Oral Daily   guaiFENesin 400 mg Oral 4x Daily   hydrocortisone  Rectal BID   hydrocortisone 25 mg Rectal BID   ipratropium-albuterol 3 mL Nebulization 4x Daily - RT   polyethylene glycol 17 g Oral Daily   predniSONE 40 mg Oral Q12H   terazosin 1 mg Per G Tube Q12H        Results from last 7 days  Lab Units 09/15/17  0658 17  0424 17  1319   WBC 10*3/mm3 5.33 3.76* 6.61   HEMOGLOBIN g/dL 10.0* 11.2* 13.5*   PLATELETS 10*3/mm3 118* 107* 142       Results from last 7 days  Lab Units 09/15/17  0658 17  0424 17  1339   SODIUM mmol/L 140 146* 150*   POTASSIUM  mmol/L 4.6 4.6 4.5   CHLORIDE mmol/L 99 104 102   CO2 mmol/L 34.1* 32.6* 40.0*   BUN mg/dL 28* 28* 29*   CREATININE mg/dL 0.44* 0.57* 0.62*   CALCIUM mg/dL 8.8 9.0 9.8   GLUCOSE mg/dL 103* 150* 107*     Estimated Creatinine Clearance: 50.4 mL/min (by C-G formula based on Cr of 0.44).    Results from last 7 days  Lab Units 09/15/17  0658 09/14/17  0424 09/13/17  1339   ALK PHOS U/L 83 103 118*   BILIRUBIN mg/dL 0.3 0.6 0.7   ALT (SGPT) U/L 121* 163* 203*   AST (SGOT) U/L 81* 134* 160*     Assessment/Plan   Active Hospital Problems (** Indicates Principal Problem)    Diagnosis Date Noted   • Acute on chronic respiratory failure [J96.20] 09/13/2017   • COPD with acute exacerbation [J44.1] 09/13/2017   • Acute metabolic encephalopathy [G93.41] 09/13/2017   • Hypernatremia [E87.0] 09/13/2017   • Dehydration [E86.0] 09/13/2017   • Hepatitis [K75.9] 09/13/2017   • Malnutrition [E46] 08/18/2017   • S/P percutaneous endoscopic gastrostomy (PEG) tube placement [Z93.1] 08/17/2017   • Dysphagia [R13.10] 08/17/2017   • 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     · Taper steroids  · LFTs improving  · Hypernatremia improved  · Probably home tomorrow     Julian Osullivan MD   09/15/17  5:20 PM

## 2017-09-15 NOTE — PROGRESS NOTES
Discharge Planning Assessment  [facility]     Patient Name: Francisco Arana Sr.  MRN: [id number]  Today's Date: 9/15/2017    Admit Date: 9/13/2017          Discharge Needs Assessment       09/15/17 1422    Living Environment    Lives With child(michelle), dependent    Living Arrangements house    Transportation Available car;family or friend will provide    Living Environment    Provides Primary Care For no one    Quality Of Family Relationships supportive    Discharge Needs Assessment    Equipment Currently Used at Home nebulizer;feeding device;wheelchair;shower chair;hospital bed;walker, rolling            Discharge Plan       09/15/17 1428    Case Management/Social Work Plan    Plan Home with [facility]    Patient/Family In Agreement With Plan yes    Additional Comments Spoke with pt's daughter Roseann who states plan is to return home with HH at discharge.  Pt was recently discharged from [facility] but wants pt to return home.  Pt lives with daughter and son in law.  Son in law has arranged work schedule so that either he or daughter will be home with him at all times.  Requests [facility] for OT, PT, RN, and aide.  Daughter states they have hospital bed, wheelchair, walker, shower chair, grab bars, feeding tube equipment, nebulizer, and O2 with [employer].  Denies further needs at this time.  Spoke with Rigoberto who will follow.  CCP will follow for needs. ROD Naranjo RN        Discharge Placement     No information found                Demographic Summary       09/15/17 1416    Referral Information    Admission Type inpatient    Arrived From home or self-care    Referral Source admission list    Reason For Consult discharge planning    Contact Information    Permission Granted to Share Information With family/designee   Shade Arana/ Roseann Arana    Primary Care Physician Information    Name Micky Veronica            Functional Status       09/15/17 1418    Functional Status Current    Ambulation 3-->assistive equipment and person     Transferring 3-->assistive equipment and person    Toileting 3-->assistive equipment and person    Bathing 3-->assistive equipment and person    Dressing 3-->assistive equipment and person    Eating 1-->assistive equipment    Functional Status Prior    Ambulation 3-->assistive equipment and person    Transferring 3-->assistive equipment and person    Toileting 3-->assistive equipment and person    Bathing 3-->assistive equipment and person    Dressing 3-->assistive equipment and person    Eating 1-->assistive equipment            Psychosocial     None            Abuse/Neglect     None            Legal     None            Substance Abuse     None            Patient Forms     None          Felicity Naranjo

## 2017-09-15 NOTE — PLAN OF CARE
Problem: Patient Care Overview (Adult)  Goal: Plan of Care Review  Outcome: Ongoing (interventions implemented as appropriate)    09/15/17 1617   Coping/Psychosocial Response Interventions   Plan Of Care Reviewed With patient   Patient Care Overview   Progress no change         Problem: COPD, Chronic Bronchitis/Emphysema (Adult)  Goal: Signs and Symptoms of Listed Potential Problems Will be Absent or Manageable (COPD, Chronic Bronchitis/Emphysema)  Outcome: Ongoing (interventions implemented as appropriate)    Problem: Fall Risk (Adult)  Goal: Identify Related Risk Factors and Signs and Symptoms  Outcome: Ongoing (interventions implemented as appropriate)    09/15/17 1617   Fall Risk   Fall Risk: Related Risk Factors age-related changes;gait/mobility problems;fatigue/slow reaction;environment unfamiliar   Fall Risk: Signs and Symptoms presence of risk factors         Problem: Pressure Ulcer (Adult)  Goal: Signs and Symptoms of Listed Potential Problems Will be Absent or Manageable (Pressure Ulcer)  Outcome: Ongoing (interventions implemented as appropriate)    Problem: Nutrition, Enteral (Adult)  Goal: Signs and Symptoms of Listed Potential Problems Will be Absent or Manageable (Nutrition, Enteral)  Outcome: Ongoing (interventions implemented as appropriate)

## 2017-09-16 VITALS
WEIGHT: 104.94 LBS | TEMPERATURE: 97.4 F | HEART RATE: 73 BPM | SYSTOLIC BLOOD PRESSURE: 107 MMHG | DIASTOLIC BLOOD PRESSURE: 63 MMHG | RESPIRATION RATE: 28 BRPM | HEIGHT: 65 IN | BODY MASS INDEX: 17.48 KG/M2 | OXYGEN SATURATION: 97 %

## 2017-09-16 LAB
BASOPHILS # BLD AUTO: 0 10*3/MM3 (ref 0–0.2)
BASOPHILS NFR BLD AUTO: 0 % (ref 0–1.5)
DEPRECATED RDW RBC AUTO: 48.3 FL (ref 37–54)
EOSINOPHIL # BLD AUTO: 0 10*3/MM3 (ref 0–0.7)
EOSINOPHIL NFR BLD AUTO: 0 % (ref 0.3–6.2)
ERYTHROCYTE [DISTWIDTH] IN BLOOD BY AUTOMATED COUNT: 12.5 % (ref 11.5–14.5)
HCT VFR BLD AUTO: 29.9 % (ref 40.4–52.2)
HGB BLD-MCNC: 9.7 G/DL (ref 13.7–17.6)
IMM GRANULOCYTES # BLD: 0.05 10*3/MM3 (ref 0–0.03)
IMM GRANULOCYTES NFR BLD: 1.1 % (ref 0–0.5)
LYMPHOCYTES # BLD AUTO: 0.24 10*3/MM3 (ref 0.9–4.8)
LYMPHOCYTES NFR BLD AUTO: 5.5 % (ref 19.6–45.3)
MCH RBC QN AUTO: 34 PG (ref 27–32.7)
MCHC RBC AUTO-ENTMCNC: 32.4 G/DL (ref 32.6–36.4)
MCV RBC AUTO: 104.9 FL (ref 79.8–96.2)
MONOCYTES # BLD AUTO: 0.16 10*3/MM3 (ref 0.2–1.2)
MONOCYTES NFR BLD AUTO: 3.7 % (ref 5–12)
NEUTROPHILS # BLD AUTO: 3.93 10*3/MM3 (ref 1.9–8.1)
NEUTROPHILS NFR BLD AUTO: 89.7 % (ref 42.7–76)
PLATELET # BLD AUTO: 143 10*3/MM3 (ref 140–500)
PMV BLD AUTO: 10.1 FL (ref 6–12)
RBC # BLD AUTO: 2.85 10*6/MM3 (ref 4.6–6)
WBC NRBC COR # BLD: 4.38 10*3/MM3 (ref 4.5–10.7)

## 2017-09-16 PROCEDURE — 85025 COMPLETE CBC W/AUTO DIFF WBC: CPT | Performed by: INTERNAL MEDICINE

## 2017-09-16 PROCEDURE — 63710000001 PREDNISONE PER 5 MG: Performed by: HOSPITALIST

## 2017-09-16 PROCEDURE — 63710000001 PREDNISONE PER 1 MG: Performed by: HOSPITALIST

## 2017-09-16 PROCEDURE — 94799 UNLISTED PULMONARY SVC/PX: CPT

## 2017-09-16 RX ORDER — PREDNISONE 1 MG/1
TABLET ORAL
Qty: 26 TABLET | Refills: 0 | Status: SHIPPED | OUTPATIENT
Start: 2017-09-16

## 2017-09-16 RX ORDER — PREDNISONE 1 MG/1
TABLET ORAL
Qty: 26 TABLET | Refills: 0 | Status: SHIPPED | OUTPATIENT
Start: 2017-09-16 | End: 2017-09-16

## 2017-09-16 RX ADMIN — POLYETHYLENE GLYCOL 3350 17 G: 17 POWDER, FOR SOLUTION ORAL at 09:55

## 2017-09-16 RX ADMIN — TERAZOSIN HYDROCHLORIDE 1 MG: 1 CAPSULE ORAL at 09:55

## 2017-09-16 RX ADMIN — ESCITALOPRAM OXALATE 10 MG: 10 TABLET ORAL at 09:55

## 2017-09-16 RX ADMIN — PREDNISONE 30 MG: 20 TABLET ORAL at 09:55

## 2017-09-16 RX ADMIN — IPRATROPIUM BROMIDE AND ALBUTEROL SULFATE 3 ML: .5; 3 SOLUTION RESPIRATORY (INHALATION) at 08:29

## 2017-09-16 RX ADMIN — IPRATROPIUM BROMIDE AND ALBUTEROL SULFATE 3 ML: .5; 3 SOLUTION RESPIRATORY (INHALATION) at 16:35

## 2017-09-16 RX ADMIN — GUAIFENESIN 400 MG: 100 SOLUTION ORAL at 09:54

## 2017-09-16 RX ADMIN — IPRATROPIUM BROMIDE AND ALBUTEROL SULFATE 3 ML: .5; 3 SOLUTION RESPIRATORY (INHALATION) at 12:07

## 2017-09-16 RX ADMIN — HYDROCORTISONE ACETATE 25 MG: 25 SUPPOSITORY RECTAL at 09:55

## 2017-09-16 RX ADMIN — GUAIFENESIN 400 MG: 100 SOLUTION ORAL at 12:43

## 2017-09-16 RX ADMIN — HYDROCORTISONE 2.5%: 25 CREAM TOPICAL at 09:55

## 2017-09-16 NOTE — PLAN OF CARE
Problem: Patient Care Overview (Adult)  Goal: Plan of Care Review  Outcome: Ongoing (interventions implemented as appropriate)    09/16/17 1346   Coping/Psychosocial Response Interventions   Plan Of Care Reviewed With patient   Patient Care Overview   Progress progress toward functional goals as expected   Outcome Evaluation   Outcome Summary/Follow up Plan VSS. Pt tolerating tube feeds. tube patent. 0 c/o pain will cont to monitor       Goal: Adult Individualization and Mutuality  Outcome: Ongoing (interventions implemented as appropriate)    Problem: COPD, Chronic Bronchitis/Emphysema (Adult)  Goal: Signs and Symptoms of Listed Potential Problems Will be Absent or Manageable (COPD, Chronic Bronchitis/Emphysema)  Outcome: Ongoing (interventions implemented as appropriate)    Problem: Pressure Ulcer (Adult)  Goal: Signs and Symptoms of Listed Potential Problems Will be Absent or Manageable (Pressure Ulcer)  Outcome: Ongoing (interventions implemented as appropriate)    Problem: Nutrition, Enteral (Adult)  Goal: Signs and Symptoms of Listed Potential Problems Will be Absent or Manageable (Nutrition, Enteral)  Outcome: Ongoing (interventions implemented as appropriate)

## 2017-09-16 NOTE — DISCHARGE SUMMARY
[facility]    Date of Discharge:  9/16/2017    PCP: Micky Veronica MD    Discharge Diagnosis:   Active Hospital Problems (** Indicates Principal Problem)    Diagnosis Date Noted   • Acute on chronic respiratory failure [J96.20] 09/13/2017   • COPD with acute exacerbation [J44.1] 09/13/2017   • Acute metabolic encephalopathy [G93.41] 09/13/2017   • Hypernatremia [E87.0] 09/13/2017   • Dehydration [E86.0] 09/13/2017   • Hepatitis [K75.9] 09/13/2017   • Malnutrition [E46] 08/18/2017   • S/P percutaneous endoscopic gastrostomy (PEG) tube placement [Z93.1] 08/17/2017   • Dysphagia [R13.10] 08/17/2017   • 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     Hospital Course  Please see history and physical for details. Patient is a 77 y.o. male with a history of deafness discharged from a nursing home day before coming to the hospital and presented with increasing shortness of breath and confusion. Chronically he is on 3 L of oxygen at home. He was wheezing and had decreased air movement throughout and was tachypneic on exam. He was admitted with a COPD exacerbation. He was started on IV steroids, scheduled nebulizers. His confusion improved as did his shortness of breath and he was weaned down to by mouth steroids. He also had dehydration and hypernatremia and that resolved with free water. Initial sodium was 150 and improved to 140. He also had elevated liver function tests that also improved. AST and ALT on admission were 160 and 203 respectively. Today they are 81 and 121 respectively. He'll go home on steroid taper and have close follow-up with his primary care physician. We will order follow-up labs for LFT elevation, anemia to follow-up in a few days to a week.    Condition on Discharge: Improved. Patient states breathing is back to normal and would like to go  home.    Temp:  [97.4 °F (36.3 °C)-97.7 °F (36.5 °C)] 97.4 °F (36.3 °C)  Heart Rate:  [] 100  Resp:  [16-24] 24  BP: ()/(56-66) 107/63  Body mass index is 17.46 kg/(m^2).    Physical Exam   Constitutional: He is oriented to person, place, and time. No distress.   Cardiovascular: Normal rate and regular rhythm.    Pulmonary/Chest: Effort normal. No respiratory distress. He has decreased breath sounds. He has no wheezes.   Abdominal: Soft. There is no tenderness.   Neurological: He is alert and oriented to person, place, and time.   Skin: Skin is warm and dry.     Discharge Medications   Francisco Arana Sr.   Home Medication Instructions EVELYN:[id number]    Printed on:09/16/17 3680   Medication Information                      escitalopram (LEXAPRO) 10 MG tablet  Take 10 mg by mouth Daily.             guaiFENesin (ROBITUSSIN) 100 MG/5ML syrup  Take 20 mg by mouth 2 (Two) Times a Day As Needed for Cough.             hydrocortisone (ANUSOL-HC) 2.5 % rectal cream  Insert  into the rectum 2 (Two) Times a Day.             hydrocortisone (ANUSOL-HC) 25 MG suppository  Insert 1 suppository into the rectum 2 (Two) Times a Day.             ipratropium-albuterol (DUO-NEB) 0.5-2.5 mg/mL nebulizer  Take 3 mL by nebulization Every 4 (Four) Hours As Needed for Wheezing or Shortness of Air.             ipratropium-albuterol (DUO-NEB) 0.5-2.5 mg/mL nebulizer  Take 3 mL by nebulization 4 (Four) Times a Day.             NON FORMULARY  Apply 1 application topically 2 (Two) Times a Day. Remedy Phytoplex Z-guard 575 topical pasta - apply thin layer to wound on coccyx and red area until healed             Nutritional Supplements (JEVITY 1.5 KENAN) liquid  55 mL by Per G Tube route Continuous. 55 cc/hr             polyethylene glycol (MIRALAX) packet  Take 17 g by mouth Daily.             predniSONE (DELTASONE) 5 MG tablet  Take 30 mg a day for 2 days then 20 mg a day for 2 days then 10 mg a day for 2 days then 5 mg a day for 2  days             terazosin (HYTRIN) 1 MG capsule  1 capsule by Per G Tube route Every 12 (Twelve) Hours.                Diet Instructions     Diet:       Diet Texture / Consistency:  Regular                Activity Instructions     Activity as Tolerated                    Additional Instructions for the Follow-ups that You Need to Schedule     Call MD for problems / concerns.    As directed        CBC (No Diff)    Sep 21, 2017 (Approximate)        Comprehensive Metabolic Panel    Sep 21, 2017 (Approximate)              Follow-up Information     Follow up with Micky Veronica MD .    Specialty:  Family Medicine    Contact information:    [address] [facility] [address] [phone]          Test Results Pending at Discharge   Order Current Status    Blood Culture Preliminary result    Blood Culture Preliminary result         Julian Osullivan MD  09/16/17  1:36 PM    Discharge time spent greater than 30 minutes.

## 2017-09-16 NOTE — PLAN OF CARE
Problem: Patient Care Overview (Adult)  Goal: Plan of Care Review  Outcome: Ongoing (interventions implemented as appropriate)    Problem: COPD, Chronic Bronchitis/Emphysema (Adult)  Goal: Signs and Symptoms of Listed Potential Problems Will be Absent or Manageable (COPD, Chronic Bronchitis/Emphysema)  Outcome: Ongoing (interventions implemented as appropriate)    Problem: Fall Risk (Adult)  Goal: Identify Related Risk Factors and Signs and Symptoms  Outcome: Ongoing (interventions implemented as appropriate)    Problem: Pressure Ulcer (Adult)  Goal: Signs and Symptoms of Listed Potential Problems Will be Absent or Manageable (Pressure Ulcer)  Outcome: Ongoing (interventions implemented as appropriate)    Problem: Nutrition, Enteral (Adult)  Goal: Signs and Symptoms of Listed Potential Problems Will be Absent or Manageable (Nutrition, Enteral)  Outcome: Ongoing (interventions implemented as appropriate)

## 2017-09-18 LAB
BACTERIA SPEC AEROBE CULT: NORMAL
BACTERIA SPEC AEROBE CULT: NORMAL

## 2017-09-18 NOTE — PROGRESS NOTES
Case Management Discharge Note    Final Note: Pt discharged home with [facility].     Discharge Placement     Facility/Agency Request Status Selected? Address Phone Number Fax Number    [facility] Accepted    [address] [address] [phone]             Discharge Codes: 06  Discharged/transferred to home under care of organized home health service organization in anticipation of skilled care

## 2021-09-15 NOTE — PROGRESS NOTES
Malnutrition Severity Assessment    Patient Name:  Francisco Arana Sr.  YOB: 1940  MRN: [id number]  Admit Date:  9/13/2017    Patient meets criteria for : Moderate malnutrition    Moderate orbital and thoracic fat loss; moderate temporalis, pectoralis muscle loss    Malnutrition Type: Chronic Illness Malnutrition     Malnutrition Type (last 8 hours)      Malnutrition Severity Assessment       09/14/17 1029    Malnutrition Severity Assessment    Malnutrition Type Chronic Illness Malnutrition      09/14/17 1029    Physical Signs of Malnutrition (Chronic)    Muscle Wasting Severe    Fat Loss Severe      09/14/17 1029    Weight Status (Chronic)    BMI Mild (<19)   BMI-17.4    %IBW Mod <80%   76%      09/14/17 1029    Criteria Met (Must meet criteria for severity in at least 2 of these categories: M Wasting, Fat Loss, Fluid, Secondary Signs, Wt. Status, Intake)    Patient meets criteria for  Moderate malnutrition                  Electronically signed by:  Naty Lan RD  09/14/17 10:31 AM       Rhofade Counseling: Rhofade is a topical medication which can decrease superficial blood flow where applied. Side effects are uncommon and include stinging, redness and allergic reactions.

## (undated) DEVICE — TUBING, SUCTION, 1/4" X 10', STRAIGHT: Brand: MEDLINE

## (undated) DEVICE — CANN NASL CO2 TRULINK W/O2 A/

## (undated) DEVICE — TBG 02 CRUSH RESIST LF CLR 7FT

## (undated) DEVICE — THE TORRENT IRRIGATION SCOPE CONNECTOR IS USED WITH THE TORRENT IRRIGATION TUBING TO PROVIDE IRRIGATION FLUIDS SUCH AS STERILE WATER DURING GASTROINTESTINAL ENDOSCOPIC PROCEDURES WHEN USED IN CONJUNCTION WITH AN IRRIGATION PUMP (OR ELECTROSURGICAL UNIT).: Brand: TORRENT

## (undated) DEVICE — Device: Brand: DEFENDO AIR/WATER/SUCTION AND BIOPSY VALVE